# Patient Record
Sex: FEMALE | Race: BLACK OR AFRICAN AMERICAN | Employment: OTHER | URBAN - METROPOLITAN AREA
[De-identification: names, ages, dates, MRNs, and addresses within clinical notes are randomized per-mention and may not be internally consistent; named-entity substitution may affect disease eponyms.]

---

## 2017-04-17 DIAGNOSIS — I10 ESSENTIAL HYPERTENSION: ICD-10-CM

## 2017-04-17 NOTE — TELEPHONE ENCOUNTER
From: Vanesa Cheek  To: Ct Alicia DO  Sent: 4/17/2017 9:56 AM CDT  Subject: Medication Renewal Request    Original authorizing provider: DO Treasure Salas would like a refill of the following medications:  hydrochlorothiazide

## 2017-04-22 RX ORDER — HYDROCHLOROTHIAZIDE 12.5 MG/1
12.5 TABLET ORAL
Qty: 90 TABLET | Refills: 0 | Status: SHIPPED | OUTPATIENT
Start: 2017-04-22 | End: 2017-07-04

## 2017-04-22 RX ORDER — HYDROCHLOROTHIAZIDE 12.5 MG/1
TABLET ORAL
Qty: 90 TABLET | Refills: 0 | OUTPATIENT
Start: 2017-04-22

## 2017-04-25 ENCOUNTER — OFFICE VISIT (OUTPATIENT)
Dept: FAMILY MEDICINE CLINIC | Facility: CLINIC | Age: 57
End: 2017-04-25

## 2017-04-25 VITALS
WEIGHT: 244 LBS | OXYGEN SATURATION: 98 % | BODY MASS INDEX: 41.66 KG/M2 | DIASTOLIC BLOOD PRESSURE: 76 MMHG | RESPIRATION RATE: 20 BRPM | TEMPERATURE: 99 F | SYSTOLIC BLOOD PRESSURE: 126 MMHG | HEART RATE: 77 BPM | HEIGHT: 64 IN

## 2017-04-25 DIAGNOSIS — I10 ESSENTIAL HYPERTENSION: Primary | ICD-10-CM

## 2017-04-25 PROCEDURE — 99213 OFFICE O/P EST LOW 20 MIN: CPT | Performed by: FAMILY MEDICINE

## 2017-04-25 NOTE — PROGRESS NOTES
HPI:    Patient ID: Demetrius Mabry is a 62year old female. HTN  This is a chronic problem. The current episode started more than 1 year ago. The problem is unchanged. The problem is controlled.  Pertinent negatives include no anxiety, blurred vision,

## 2017-07-04 DIAGNOSIS — I10 ESSENTIAL HYPERTENSION: ICD-10-CM

## 2017-07-05 RX ORDER — HYDROCHLOROTHIAZIDE 12.5 MG/1
TABLET ORAL
Qty: 90 TABLET | Refills: 0 | Status: SHIPPED | OUTPATIENT
Start: 2017-07-05 | End: 2017-10-02

## 2017-10-02 DIAGNOSIS — I10 ESSENTIAL HYPERTENSION: ICD-10-CM

## 2017-10-03 RX ORDER — HYDROCHLOROTHIAZIDE 12.5 MG/1
TABLET ORAL
Qty: 90 TABLET | Refills: 0 | Status: SHIPPED | OUTPATIENT
Start: 2017-10-03 | End: 2017-12-04

## 2017-12-04 ENCOUNTER — OFFICE VISIT (OUTPATIENT)
Dept: FAMILY MEDICINE CLINIC | Facility: CLINIC | Age: 57
End: 2017-12-04

## 2017-12-04 VITALS
WEIGHT: 246 LBS | BODY MASS INDEX: 42 KG/M2 | HEART RATE: 90 BPM | TEMPERATURE: 98 F | OXYGEN SATURATION: 98 % | RESPIRATION RATE: 22 BRPM | SYSTOLIC BLOOD PRESSURE: 136 MMHG | DIASTOLIC BLOOD PRESSURE: 80 MMHG | HEIGHT: 64 IN

## 2017-12-04 DIAGNOSIS — Z13.820 SCREENING FOR OSTEOPOROSIS: ICD-10-CM

## 2017-12-04 DIAGNOSIS — Z12.31 ENCOUNTER FOR SCREENING MAMMOGRAM FOR HIGH-RISK PATIENT: ICD-10-CM

## 2017-12-04 DIAGNOSIS — Z12.11 COLON CANCER SCREENING: ICD-10-CM

## 2017-12-04 DIAGNOSIS — Z00.00 ANNUAL PHYSICAL EXAM: Primary | ICD-10-CM

## 2017-12-04 DIAGNOSIS — E04.9 GOITER: ICD-10-CM

## 2017-12-04 DIAGNOSIS — I10 ESSENTIAL HYPERTENSION: ICD-10-CM

## 2017-12-04 DIAGNOSIS — Z01.419 WELL WOMAN EXAM: ICD-10-CM

## 2017-12-04 PROCEDURE — 88175 CYTOPATH C/V AUTO FLUID REDO: CPT | Performed by: FAMILY MEDICINE

## 2017-12-04 PROCEDURE — 99396 PREV VISIT EST AGE 40-64: CPT | Performed by: FAMILY MEDICINE

## 2017-12-04 PROCEDURE — 87624 HPV HI-RISK TYP POOLED RSLT: CPT | Performed by: FAMILY MEDICINE

## 2017-12-04 RX ORDER — HYDROCHLOROTHIAZIDE 12.5 MG/1
12.5 TABLET ORAL
Qty: 90 TABLET | Refills: 1 | Status: SHIPPED | OUTPATIENT
Start: 2017-12-04 | End: 2018-06-06

## 2017-12-04 NOTE — PROGRESS NOTES
HPI:   Herman Neal is a 62year old female who presents for a complete physical exam.      Wt Readings from Last 6 Encounters:  12/04/17 : 246 lb  04/25/17 : 244 lb  09/02/16 : 240 lb  11/23/15 : 249 lb  07/29/15 : 244 lb  06/10/15 : 249 lb    Body m [facility]    Exercise: minimal.  Diet: watches minimally     REVIEW OF SYSTEMS:   GENERAL: feels well otherwise  SKIN: denies any unusual skin lesions  EYES:denies blurred vision or double vision  HEENT: denies nasal congestion, sinus pain or ST  LUNGS: Dispense: 90 tablet; Refill: 1    2. Annual physical exam    - FREE T4 (FREE THYROXINE); Future  - ASSAY, THYROID STIM HORMONE; Future  - LIPID PANEL; Future  - CBC WITH DIFFERENTIAL WITH PLATELET;  Future  - VITAMIN B12; Future  - VITAMIN D, 25-HYDROXY; Fu

## 2017-12-16 ENCOUNTER — HOSPITAL ENCOUNTER (OUTPATIENT)
Dept: ULTRASOUND IMAGING | Age: 57
Discharge: HOME | End: 2017-12-16
Attending: FAMILY MEDICINE
Payer: COMMERCIAL

## 2017-12-16 DIAGNOSIS — E04.9 GOITER: ICD-10-CM

## 2017-12-16 PROCEDURE — 76536 US EXAM OF HEAD AND NECK: CPT | Performed by: FAMILY MEDICINE

## 2018-01-06 ENCOUNTER — HOSPITAL ENCOUNTER (OUTPATIENT)
Dept: BONE DENSITY | Age: 58
Discharge: HOME | End: 2018-01-06
Attending: FAMILY MEDICINE
Payer: COMMERCIAL

## 2018-01-06 ENCOUNTER — HOSPITAL ENCOUNTER (OUTPATIENT)
Dept: MAMMOGRAPHY | Age: 58
Discharge: HOME | End: 2018-01-06
Attending: FAMILY MEDICINE
Payer: COMMERCIAL

## 2018-01-06 DIAGNOSIS — Z13.820 SCREENING FOR OSTEOPOROSIS: ICD-10-CM

## 2018-01-06 DIAGNOSIS — Z12.31 ENCOUNTER FOR SCREENING MAMMOGRAM FOR HIGH-RISK PATIENT: ICD-10-CM

## 2018-01-06 PROCEDURE — 77080 DXA BONE DENSITY AXIAL: CPT | Performed by: FAMILY MEDICINE

## 2018-01-06 PROCEDURE — 77063 BREAST TOMOSYNTHESIS BI: CPT | Performed by: FAMILY MEDICINE

## 2018-01-06 PROCEDURE — 77067 SCR MAMMO BI INCL CAD: CPT | Performed by: FAMILY MEDICINE

## 2018-01-09 ENCOUNTER — PATIENT MESSAGE (OUTPATIENT)
Dept: FAMILY MEDICINE CLINIC | Facility: CLINIC | Age: 58
End: 2018-01-09

## 2018-01-10 NOTE — TELEPHONE ENCOUNTER
Labs needed to assess vit d dosing ; labs in the system  Calcium 1,200 mg total ; at least half of that from food

## 2018-01-10 NOTE — TELEPHONE ENCOUNTER
From: Aditya Montenegro  To: Nedra Sanchez DO  Sent: 1/9/2018 5:44 PM CST  Subject: Test Results Question    I have a question about XR DEXA BONE DENSITOMETRY (CPT=77080) resulted on 1/6/18, 11:21 AM.    How much mg vitamin D should I take and also calciu

## 2018-01-15 LAB
ABSOLUTE BASOPHILS: 18 CELLS/UL (ref 0–200)
ABSOLUTE EOSINOPHILS: 60 CELLS/UL (ref 15–500)
ABSOLUTE LYMPHOCYTES: 2802 CELLS/UL (ref 850–3900)
ABSOLUTE MONOCYTES: 384 CELLS/UL (ref 200–950)
ABSOLUTE NEUTROPHILS: 2736 CELLS/UL (ref 1500–7800)
ALBUMIN/GLOBULIN RATIO: 1.4 (CALC) (ref 1–2.5)
ALBUMIN: 3.8 G/DL (ref 3.6–5.1)
ALKALINE PHOSPHATASE: 88 U/L (ref 33–130)
ALT: 39 U/L (ref 6–29)
AST: 27 U/L (ref 10–35)
BASOPHILS: 0.3 %
BILIRUBIN, TOTAL: 0.5 MG/DL (ref 0.2–1.2)
BUN: 12 MG/DL (ref 7–25)
CALCIUM: 9.2 MG/DL (ref 8.6–10.4)
CARBON DIOXIDE: 29 MMOL/L (ref 20–31)
CHLORIDE: 106 MMOL/L (ref 98–110)
CHOL/HDLC RATIO: 2.1 (CALC)
CHOLESTEROL, TOTAL: 161 MG/DL
CREATININE: 0.85 MG/DL (ref 0.5–1.05)
EGFR IF AFRICN AM: 88 ML/MIN/1.73M2
EGFR IF NONAFRICN AM: 76 ML/MIN/1.73M2
EOSINOPHILS: 1 %
GLOBULIN: 2.8 G/DL (CALC) (ref 1.9–3.7)
GLUCOSE: 91 MG/DL (ref 65–99)
HDL CHOLESTEROL: 78 MG/DL
HEMATOCRIT: 38.3 % (ref 35–45)
HEMOGLOBIN: 12.6 G/DL (ref 11.7–15.5)
LDL-CHOLESTEROL: 70 MG/DL (CALC)
LYMPHOCYTES: 46.7 %
MCH: 27.7 PG (ref 27–33)
MCHC: 32.9 G/DL (ref 32–36)
MCV: 84.2 FL (ref 80–100)
MONOCYTES: 6.4 %
MPV: 10.4 FL (ref 7.5–12.5)
NEUTROPHILS: 45.6 %
NON-HDL CHOLESTEROL: 83 MG/DL (CALC)
PLATELET COUNT: 273 THOUSAND/UL (ref 140–400)
POTASSIUM: 4 MMOL/L (ref 3.5–5.3)
PROTEIN, TOTAL: 6.6 G/DL (ref 6.1–8.1)
RDW: 14.4 % (ref 11–15)
RED BLOOD CELL COUNT: 4.55 MILLION/UL (ref 3.8–5.1)
SODIUM: 141 MMOL/L (ref 135–146)
T4, FREE: 1 NG/DL (ref 0.8–1.8)
TRIGLYCERIDES: 45 MG/DL
TSH: 1.05 MIU/L (ref 0.4–4.5)
VITAMIN B12: 578 PG/ML (ref 200–1100)
VITAMIN D, 25-OH, TOTAL: 26 NG/ML (ref 30–100)
WHITE BLOOD CELL COUNT: 6 THOUSAND/UL (ref 3.8–10.8)

## 2018-01-18 ENCOUNTER — APPOINTMENT (OUTPATIENT)
Dept: LAB | Facility: HOSPITAL | Age: 58
End: 2018-01-18
Attending: FAMILY MEDICINE
Payer: COMMERCIAL

## 2018-01-18 DIAGNOSIS — Z12.11 COLON CANCER SCREENING: ICD-10-CM

## 2018-01-18 PROCEDURE — 82272 OCCULT BLD FECES 1-3 TESTS: CPT

## 2018-02-12 ENCOUNTER — PATIENT MESSAGE (OUTPATIENT)
Dept: FAMILY MEDICINE CLINIC | Facility: CLINIC | Age: 58
End: 2018-02-12

## 2018-02-13 NOTE — TELEPHONE ENCOUNTER
From: Rebecca Akins  To: Lacne Rod DO  Sent: 2/12/2018 8:38 PM CST  Subject: Other    Hi, when do I need to redo the vitamin D3 blood work?

## 2018-05-18 ENCOUNTER — NURSE ONLY (OUTPATIENT)
Dept: FAMILY MEDICINE CLINIC | Facility: CLINIC | Age: 58
End: 2018-05-18

## 2018-05-18 VITALS
HEART RATE: 88 BPM | DIASTOLIC BLOOD PRESSURE: 80 MMHG | RESPIRATION RATE: 16 BRPM | TEMPERATURE: 99 F | OXYGEN SATURATION: 98 % | WEIGHT: 245 LBS | HEIGHT: 63 IN | SYSTOLIC BLOOD PRESSURE: 132 MMHG | BODY MASS INDEX: 43.41 KG/M2

## 2018-05-18 DIAGNOSIS — J06.9 VIRAL URI WITH COUGH: Primary | ICD-10-CM

## 2018-05-18 PROCEDURE — 99213 OFFICE O/P EST LOW 20 MIN: CPT | Performed by: NURSE PRACTITIONER

## 2018-05-18 NOTE — PROGRESS NOTES
CHIEF COMPLAINT:   Patient presents with:  Cough: Bodyaches,chills. HPI:   Michael Collins is a 62year old female who presents for sudden onset of flu-like symptoms. Symptoms began 1 days ago.   Patient reports body aches, chills,  dry cough, dull THROAT: Oral mucosa pink, moist. Posterior pharynx is without erythematous. no exudates. Tonsils 1/4. NECK: Supple, non-tender  LUNGS: clear to auscultation bilaterally, no wheezes or rhonchi. Breathing is non labored.   CARDIO: RRR without murmur  GI: a · Avoid being exposed to cigarette smoke (yours or others’).   · You may use acetaminophen or ibuprofen to control pain and fever, unless another medicine was prescribed. If you have chronic liver or kidney disease, have ever had a stomach ulcer or gastroin

## 2018-06-06 DIAGNOSIS — I10 ESSENTIAL HYPERTENSION: ICD-10-CM

## 2018-06-07 RX ORDER — HYDROCHLOROTHIAZIDE 12.5 MG/1
TABLET ORAL
Qty: 30 TABLET | Refills: 0 | Status: SHIPPED | OUTPATIENT
Start: 2018-06-07 | End: 2018-11-19

## 2018-06-09 ENCOUNTER — PATIENT MESSAGE (OUTPATIENT)
Dept: FAMILY MEDICINE CLINIC | Facility: CLINIC | Age: 58
End: 2018-06-09

## 2018-06-09 DIAGNOSIS — E55.9 VITAMIN D DEFICIENCY: Primary | ICD-10-CM

## 2018-06-11 NOTE — TELEPHONE ENCOUNTER
From: Luisito Dumont  To: Dany Hays DO  Sent: 6/9/2018 11:20 AM CDT  Subject: Test Results Question    I was under the impression I was doing a repeat vitamin D3 test only to find out the order was for CBC.  Not sure what that is for but the quest l

## 2018-08-06 ENCOUNTER — APPOINTMENT (OUTPATIENT)
Dept: GENERAL RADIOLOGY | Age: 58
End: 2018-08-06
Attending: EMERGENCY MEDICINE
Payer: COMMERCIAL

## 2018-08-06 ENCOUNTER — APPOINTMENT (OUTPATIENT)
Dept: ULTRASOUND IMAGING | Age: 58
End: 2018-08-06
Attending: EMERGENCY MEDICINE
Payer: COMMERCIAL

## 2018-08-06 ENCOUNTER — PATIENT MESSAGE (OUTPATIENT)
Dept: FAMILY MEDICINE CLINIC | Facility: CLINIC | Age: 58
End: 2018-08-06

## 2018-08-06 ENCOUNTER — HOSPITAL ENCOUNTER (EMERGENCY)
Age: 58
Discharge: HOME | End: 2018-08-06
Attending: EMERGENCY MEDICINE
Payer: COMMERCIAL

## 2018-08-06 VITALS
OXYGEN SATURATION: 99 % | TEMPERATURE: 98 F | BODY MASS INDEX: 40.97 KG/M2 | DIASTOLIC BLOOD PRESSURE: 70 MMHG | HEIGHT: 64 IN | HEART RATE: 90 BPM | RESPIRATION RATE: 18 BRPM | SYSTOLIC BLOOD PRESSURE: 134 MMHG | WEIGHT: 240 LBS

## 2018-08-06 DIAGNOSIS — M79.89 SWELLING OF LEFT LOWER EXTREMITY: ICD-10-CM

## 2018-08-06 DIAGNOSIS — S86.912A KNEE STRAIN, LEFT, INITIAL ENCOUNTER: Primary | ICD-10-CM

## 2018-08-06 DIAGNOSIS — M71.22 BAKER'S CYST OF KNEE, LEFT: ICD-10-CM

## 2018-08-06 PROCEDURE — 73560 X-RAY EXAM OF KNEE 1 OR 2: CPT | Performed by: EMERGENCY MEDICINE

## 2018-08-06 PROCEDURE — 99284 EMERGENCY DEPT VISIT MOD MDM: CPT

## 2018-08-06 PROCEDURE — 93971 EXTREMITY STUDY: CPT | Performed by: EMERGENCY MEDICINE

## 2018-08-06 NOTE — ED PROVIDER NOTES
Patient Seen in: [facility] In [place]    History   Patient presents with:  Deep Vein Thrombosis (cardiovascular)    Stated Complaint: Left leg swelling    HPI    This is a 55-year-old female who presents with what she describes some murmurs    Extremities: Good pulses bilaterally. The left calf is swollen is mildly tender. There is no redness or cellulitis noted she also has some mild tenderness over the left knee.   No ankle or foot tenderness is noted good pulses sensory exam is swelling and pain    FINDINGS:  EXTREMITY EXAMINED:  Left lower extremity SAPHENOFEMORAL JUNCTION:  No reflux. THROMBI:  None visible. COMPRESSION:  Normal compressibility, phasicity, and augmentation. OTHER:  Orellana's cyst measures 3.8 x 1.2 x 2.4 cm.

## 2018-08-07 NOTE — TELEPHONE ENCOUNTER
From: Sherry Brito  To: Remedios Tomlinson DO  Sent: 8/6/2018 10:43 PM CDT  Subject: Non-Urgent Medical Question    I had knee pain with swelling and went to urgent care, the doctor suspected blood clot and sent me to [facility] ) Brenda Polanco

## 2018-08-13 ENCOUNTER — OFFICE VISIT (OUTPATIENT)
Dept: FAMILY MEDICINE CLINIC | Facility: CLINIC | Age: 58
End: 2018-08-13
Payer: COMMERCIAL

## 2018-08-13 VITALS
OXYGEN SATURATION: 98 % | SYSTOLIC BLOOD PRESSURE: 138 MMHG | HEART RATE: 84 BPM | BODY MASS INDEX: 42 KG/M2 | WEIGHT: 246 LBS | DIASTOLIC BLOOD PRESSURE: 82 MMHG | RESPIRATION RATE: 16 BRPM | TEMPERATURE: 98 F | HEIGHT: 64 IN

## 2018-08-13 DIAGNOSIS — M71.22 SYNOVIAL CYST OF LEFT POPLITEAL SPACE: Primary | ICD-10-CM

## 2018-08-13 PROCEDURE — 99213 OFFICE O/P EST LOW 20 MIN: CPT | Performed by: FAMILY MEDICINE

## 2018-08-13 PROCEDURE — 1111F DSCHRG MED/CURRENT MED MERGE: CPT | Performed by: FAMILY MEDICINE

## 2018-08-13 NOTE — PROGRESS NOTES
HPI:    Patient ID: Alvin Krishnan is a 62year old female. Pt came in today for an ER fup for swelling and pain in her left leg.  Pt states it started over a week ago when her left knee was hurting and then she noticed her left calf was starting to s see orthopedics to drain the baker's cyst    1.  Synovial cyst of left popliteal space  - ORTHOPEDIC - INTERNAL  Meds This Visit:  No prescriptions requested or ordered in this encounter    Imaging & Referrals:  ORTHOPEDIC - INTERNAL       LF#[id number]

## 2018-10-30 DIAGNOSIS — I10 ESSENTIAL HYPERTENSION: ICD-10-CM

## 2018-10-30 RX ORDER — HYDROCHLOROTHIAZIDE 12.5 MG/1
12.5 TABLET ORAL
Qty: 30 TABLET | Refills: 0 | OUTPATIENT
Start: 2018-10-30

## 2018-10-30 NOTE — TELEPHONE ENCOUNTER
Rx Request  HYDROCHLOROTHIAZIDE 12.5 MG Oral Tab    Disp:     30               R:  0          Last Visit: 08/13/2018    Last Refilled: 06/07/2018     Protocol Passed?  [email or web address].Art  ]       No[  ]

## 2018-11-19 ENCOUNTER — PATIENT MESSAGE (OUTPATIENT)
Dept: FAMILY MEDICINE CLINIC | Facility: CLINIC | Age: 58
End: 2018-11-19

## 2018-11-19 DIAGNOSIS — I10 ESSENTIAL HYPERTENSION: ICD-10-CM

## 2018-11-19 RX ORDER — HYDROCHLOROTHIAZIDE 12.5 MG/1
12.5 TABLET ORAL
Qty: 30 TABLET | Refills: 0 | Status: SHIPPED | OUTPATIENT
Start: 2018-11-19 | End: 2018-12-26

## 2018-11-19 NOTE — TELEPHONE ENCOUNTER
From: Franki Hoskins  To: Zeb Shukla DO  Sent: 11/19/2018 11:05 AM CST  Subject: Prescription Question    I requested a refill of my meds but still nothing so had to request again. I only have 5 tablets left. Thank you.

## 2018-12-26 ENCOUNTER — OFFICE VISIT (OUTPATIENT)
Dept: FAMILY MEDICINE CLINIC | Facility: CLINIC | Age: 58
End: 2018-12-26
Payer: COMMERCIAL

## 2018-12-26 ENCOUNTER — HOSPITAL ENCOUNTER (OUTPATIENT)
Dept: ULTRASOUND IMAGING | Age: 58
Discharge: HOME | End: 2018-12-26
Attending: FAMILY MEDICINE
Payer: COMMERCIAL

## 2018-12-26 VITALS
HEART RATE: 78 BPM | OXYGEN SATURATION: 98 % | WEIGHT: 241 LBS | DIASTOLIC BLOOD PRESSURE: 74 MMHG | HEIGHT: 64 IN | BODY MASS INDEX: 41.15 KG/M2 | SYSTOLIC BLOOD PRESSURE: 118 MMHG | RESPIRATION RATE: 16 BRPM | TEMPERATURE: 98 F

## 2018-12-26 DIAGNOSIS — Z01.419 WELL WOMAN EXAM WITH ROUTINE GYNECOLOGICAL EXAM: Primary | ICD-10-CM

## 2018-12-26 DIAGNOSIS — E04.9 GOITER: ICD-10-CM

## 2018-12-26 DIAGNOSIS — I10 ESSENTIAL HYPERTENSION: ICD-10-CM

## 2018-12-26 DIAGNOSIS — Z00.00 ANNUAL PHYSICAL EXAM: ICD-10-CM

## 2018-12-26 DIAGNOSIS — Z12.11 SCREEN FOR COLON CANCER: ICD-10-CM

## 2018-12-26 DIAGNOSIS — Z12.31 ENCOUNTER FOR SCREENING MAMMOGRAM FOR HIGH-RISK PATIENT: ICD-10-CM

## 2018-12-26 PROCEDURE — 99396 PREV VISIT EST AGE 40-64: CPT | Performed by: FAMILY MEDICINE

## 2018-12-26 PROCEDURE — 76536 US EXAM OF HEAD AND NECK: CPT | Performed by: FAMILY MEDICINE

## 2018-12-26 RX ORDER — HYDROCHLOROTHIAZIDE 12.5 MG/1
12.5 TABLET ORAL
Qty: 90 TABLET | Refills: 1 | Status: SHIPPED | OUTPATIENT
Start: 2018-12-26 | End: 2019-05-28

## 2018-12-26 NOTE — PROGRESS NOTES
HPI:   Zachary Beaulieu is a 62year old female who presents for a complete physical exam.      Wt Readings from Last 6 Encounters:  12/26/18 : 241 lb  08/13/18 : 246 lb  08/06/18 : 240 lb  05/18/18 : 245 lb  12/04/17 : 246 lb  04/25/17 : 244 lb    Body m minimal.  Diet: watches minimally     REVIEW OF SYSTEMS:   GENERAL: feels well otherwise  SKIN: denies any unusual skin lesions  EYES:denies blurred vision or double vision  HEENT: denies nasal congestion, sinus pain or ST  LUNGS: denies shortness of breat SERGEI (CPT=77067/77587); Future    3. Annual physical exam    - FREE T4 (FREE THYROXINE); Future  - ASSAY, THYROID STIM HORMONE; Future  - LIPID PANEL; Future  - CBC WITH DIFFERENTIAL WITH PLATELET;  Future  - VITAMIN B12; Future  - VITAMIN D, 25-HYDROXY; F

## 2019-01-19 ENCOUNTER — HOSPITAL ENCOUNTER (OUTPATIENT)
Dept: MAMMOGRAPHY | Age: 59
Discharge: HOME | End: 2019-01-19
Attending: FAMILY MEDICINE
Payer: COMMERCIAL

## 2019-01-19 DIAGNOSIS — Z12.31 ENCOUNTER FOR SCREENING MAMMOGRAM FOR HIGH-RISK PATIENT: ICD-10-CM

## 2019-01-19 PROCEDURE — 77063 BREAST TOMOSYNTHESIS BI: CPT | Performed by: FAMILY MEDICINE

## 2019-01-19 PROCEDURE — 77067 SCR MAMMO BI INCL CAD: CPT | Performed by: FAMILY MEDICINE

## 2019-05-28 ENCOUNTER — OFFICE VISIT (OUTPATIENT)
Dept: FAMILY MEDICINE CLINIC | Facility: CLINIC | Age: 59
End: 2019-05-28
Payer: COMMERCIAL

## 2019-05-28 VITALS
HEIGHT: 64 IN | OXYGEN SATURATION: 99 % | BODY MASS INDEX: 39.61 KG/M2 | SYSTOLIC BLOOD PRESSURE: 120 MMHG | WEIGHT: 232 LBS | RESPIRATION RATE: 16 BRPM | DIASTOLIC BLOOD PRESSURE: 72 MMHG | HEART RATE: 88 BPM | TEMPERATURE: 98 F

## 2019-05-28 DIAGNOSIS — Z12.11 COLON CANCER SCREENING: ICD-10-CM

## 2019-05-28 DIAGNOSIS — R03.0 ELEVATED BLOOD PRESSURE READING: Primary | ICD-10-CM

## 2019-05-28 PROCEDURE — 99213 OFFICE O/P EST LOW 20 MIN: CPT | Performed by: FAMILY MEDICINE

## 2019-05-28 NOTE — PROGRESS NOTES
HPI:   Patricia Jacobson is a 61year old female who presents for bp follow up     Pt has lost 13 lbs from a low carb diet and regular exercise   Pt c/o dry eye - eye doctor thinks it is related to her bp med   Pt is not checking bp   Patient denies chest nourished,in no apparent distress  CARDIO: RRR without murmur  LUNGS: clear to auscultation  NECK: supple,no adenopathy, no JVD no carotid bruits  HEENT: atraumatic, normocephalic,ears and throat are clear  EYES:PERRLA, EOMI, normal,conjunctiva are clear

## 2019-06-04 PROCEDURE — 82274 ASSAY TEST FOR BLOOD FECAL: CPT | Performed by: FAMILY MEDICINE

## 2019-06-23 DIAGNOSIS — I10 ESSENTIAL HYPERTENSION: ICD-10-CM

## 2019-06-24 RX ORDER — HYDROCHLOROTHIAZIDE 12.5 MG/1
TABLET ORAL
Qty: 90 TABLET | Refills: 1 | Status: SHIPPED | OUTPATIENT
Start: 2019-06-24 | End: 2019-12-26

## 2019-06-25 ENCOUNTER — NURSE ONLY (OUTPATIENT)
Dept: FAMILY MEDICINE CLINIC | Facility: CLINIC | Age: 59
End: 2019-06-25
Payer: COMMERCIAL

## 2019-06-25 VITALS — DIASTOLIC BLOOD PRESSURE: 80 MMHG | SYSTOLIC BLOOD PRESSURE: 122 MMHG

## 2019-12-25 NOTE — PROGRESS NOTES
HPI:   Isai Larson is a 61year old female who presents for a complete physical exam.      Wt Readings from Last 6 Encounters:  12/26/19 : 224 lb (101.6 kg)  05/28/19 : 232 lb (105.2 kg)  12/26/18 : 241 lb (109.3 kg)  08/13/18 : 246 lb (111.6 kg)  08 skin lesions  EYES:denies blurred vision or double vision  HEENT: denies nasal congestion, sinus pain or ST  LUNGS: denies shortness of breath with exertion  CARDIOVASCULAR: denies chest pain on exertion  GI: denies abdominal pain,denies heartburn  : den [id number]  - COMP METABOLIC PANEL (14)    3. Encounter for screening mammogram for breast cancer    - [facility] DOUG 2D+3D SCREENING BILAT (CPT=77067/90182); Future    4. Screening for osteoporosis    - XR DEXA BONE DENSITOMETRY (CPT=77080); Future    5.  Essent

## 2019-12-26 ENCOUNTER — OFFICE VISIT (OUTPATIENT)
Dept: FAMILY MEDICINE CLINIC | Facility: CLINIC | Age: 59
End: 2019-12-26
Payer: COMMERCIAL

## 2019-12-26 VITALS
HEART RATE: 92 BPM | WEIGHT: 224 LBS | DIASTOLIC BLOOD PRESSURE: 86 MMHG | OXYGEN SATURATION: 96 % | RESPIRATION RATE: 16 BRPM | SYSTOLIC BLOOD PRESSURE: 136 MMHG | BODY MASS INDEX: 39.2 KG/M2 | TEMPERATURE: 99 F | HEIGHT: 63.25 IN

## 2019-12-26 DIAGNOSIS — Z00.00 ANNUAL PHYSICAL EXAM: ICD-10-CM

## 2019-12-26 DIAGNOSIS — Z01.419 WELL WOMAN EXAM WITH ROUTINE GYNECOLOGICAL EXAM: Primary | ICD-10-CM

## 2019-12-26 DIAGNOSIS — Z12.31 ENCOUNTER FOR SCREENING MAMMOGRAM FOR BREAST CANCER: ICD-10-CM

## 2019-12-26 DIAGNOSIS — I10 ESSENTIAL HYPERTENSION: ICD-10-CM

## 2019-12-26 DIAGNOSIS — Z12.11 COLON CANCER SCREENING: ICD-10-CM

## 2019-12-26 DIAGNOSIS — Z13.820 SCREENING FOR OSTEOPOROSIS: ICD-10-CM

## 2019-12-26 PROCEDURE — 99396 PREV VISIT EST AGE 40-64: CPT | Performed by: FAMILY MEDICINE

## 2020-03-13 ENCOUNTER — TELEPHONE (OUTPATIENT)
Dept: FAMILY MEDICINE CLINIC | Facility: CLINIC | Age: 60
End: 2020-03-13

## 2021-04-05 ENCOUNTER — OFFICE VISIT (OUTPATIENT)
Dept: FAMILY MEDICINE CLINIC | Facility: CLINIC | Age: 61
End: 2021-04-05
Payer: COMMERCIAL

## 2021-04-05 VITALS
HEART RATE: 95 BPM | WEIGHT: 241 LBS | RESPIRATION RATE: 18 BRPM | DIASTOLIC BLOOD PRESSURE: 96 MMHG | BODY MASS INDEX: 42.17 KG/M2 | HEIGHT: 63.25 IN | OXYGEN SATURATION: 99 % | SYSTOLIC BLOOD PRESSURE: 148 MMHG | TEMPERATURE: 98 F

## 2021-04-05 DIAGNOSIS — Z12.11 COLON CANCER SCREENING: ICD-10-CM

## 2021-04-05 DIAGNOSIS — M75.81 RIGHT ROTATOR CUFF TENDONITIS: ICD-10-CM

## 2021-04-05 DIAGNOSIS — Z00.00 ANNUAL PHYSICAL EXAM: ICD-10-CM

## 2021-04-05 DIAGNOSIS — I10 ESSENTIAL HYPERTENSION: ICD-10-CM

## 2021-04-05 DIAGNOSIS — Z12.31 ENCOUNTER FOR SCREENING MAMMOGRAM FOR HIGH-RISK PATIENT: ICD-10-CM

## 2021-04-05 DIAGNOSIS — Z13.820 SCREENING FOR OSTEOPOROSIS: ICD-10-CM

## 2021-04-05 DIAGNOSIS — M25.561 CHRONIC PAIN OF RIGHT KNEE: ICD-10-CM

## 2021-04-05 DIAGNOSIS — Z01.419 WELL WOMAN EXAM WITH ROUTINE GYNECOLOGICAL EXAM: Primary | ICD-10-CM

## 2021-04-05 DIAGNOSIS — M79.645 FINGER PAIN, LEFT: ICD-10-CM

## 2021-04-05 DIAGNOSIS — G89.29 CHRONIC PAIN OF RIGHT KNEE: ICD-10-CM

## 2021-04-05 PROCEDURE — 90471 IMMUNIZATION ADMIN: CPT | Performed by: FAMILY MEDICINE

## 2021-04-05 PROCEDURE — 90715 TDAP VACCINE 7 YRS/> IM: CPT | Performed by: FAMILY MEDICINE

## 2021-04-05 PROCEDURE — 99396 PREV VISIT EST AGE 40-64: CPT | Performed by: FAMILY MEDICINE

## 2021-04-05 PROCEDURE — 3008F BODY MASS INDEX DOCD: CPT | Performed by: FAMILY MEDICINE

## 2021-04-05 PROCEDURE — 3080F DIAST BP >= 90 MM HG: CPT | Performed by: FAMILY MEDICINE

## 2021-04-05 PROCEDURE — 3077F SYST BP >= 140 MM HG: CPT | Performed by: FAMILY MEDICINE

## 2021-04-05 PROCEDURE — 99213 OFFICE O/P EST LOW 20 MIN: CPT | Performed by: FAMILY MEDICINE

## 2021-04-05 RX ORDER — LOSARTAN POTASSIUM 25 MG/1
25 TABLET ORAL DAILY
Qty: 30 TABLET | Refills: 0 | Status: SHIPPED | OUTPATIENT
Start: 2021-04-05 | End: 2021-05-03

## 2021-04-05 NOTE — PROGRESS NOTES
HPI:   Jessica Keith is a 64year old female who presents for a complete physical exam.      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  Diet: watch Right knee: + medial joint line pain / neg lachman/ normal MCL LCL   EXTREMITIES: no cyanosis, clubbing or edema  NEURO: cranial nerves are intact,motor and sensory are grossly intact    ASSESSMENT AND PLAN:   Zachary Beaulieu is a 64year old female wh

## 2021-04-17 ENCOUNTER — HOSPITAL ENCOUNTER (OUTPATIENT)
Dept: MAMMOGRAPHY | Age: 61
Discharge: HOME | End: 2021-04-17
Attending: FAMILY MEDICINE
Payer: COMMERCIAL

## 2021-04-17 ENCOUNTER — HOSPITAL ENCOUNTER (OUTPATIENT)
Dept: BONE DENSITY | Age: 61
Discharge: HOME | End: 2021-04-17
Attending: FAMILY MEDICINE
Payer: COMMERCIAL

## 2021-04-17 ENCOUNTER — HOSPITAL ENCOUNTER (OUTPATIENT)
Dept: GENERAL RADIOLOGY | Age: 61
Discharge: HOME | End: 2021-04-17
Attending: FAMILY MEDICINE
Payer: COMMERCIAL

## 2021-04-17 DIAGNOSIS — Z13.820 SCREENING FOR OSTEOPOROSIS: ICD-10-CM

## 2021-04-17 DIAGNOSIS — Z12.31 ENCOUNTER FOR SCREENING MAMMOGRAM FOR HIGH-RISK PATIENT: ICD-10-CM

## 2021-04-17 DIAGNOSIS — M79.645 FINGER PAIN, LEFT: ICD-10-CM

## 2021-04-17 PROCEDURE — 77067 SCR MAMMO BI INCL CAD: CPT | Performed by: FAMILY MEDICINE

## 2021-04-17 PROCEDURE — 77063 BREAST TOMOSYNTHESIS BI: CPT | Performed by: FAMILY MEDICINE

## 2021-04-17 PROCEDURE — 77080 DXA BONE DENSITY AXIAL: CPT | Performed by: FAMILY MEDICINE

## 2021-04-17 PROCEDURE — 73140 X-RAY EXAM OF FINGER(S): CPT | Performed by: FAMILY MEDICINE

## 2021-05-03 RX ORDER — LOSARTAN POTASSIUM 25 MG/1
25 TABLET ORAL DAILY
Qty: 30 TABLET | Refills: 0 | Status: SHIPPED | OUTPATIENT
Start: 2021-05-03 | End: 2021-06-18

## 2021-06-18 ENCOUNTER — OFFICE VISIT (OUTPATIENT)
Dept: FAMILY MEDICINE CLINIC | Facility: CLINIC | Age: 61
End: 2021-06-18
Payer: COMMERCIAL

## 2021-06-18 VITALS
DIASTOLIC BLOOD PRESSURE: 100 MMHG | OXYGEN SATURATION: 99 % | RESPIRATION RATE: 16 BRPM | BODY MASS INDEX: 42.17 KG/M2 | HEIGHT: 63.5 IN | SYSTOLIC BLOOD PRESSURE: 148 MMHG | WEIGHT: 241 LBS | HEART RATE: 105 BPM

## 2021-06-18 DIAGNOSIS — I10 ESSENTIAL HYPERTENSION: Primary | ICD-10-CM

## 2021-06-18 PROCEDURE — 99213 OFFICE O/P EST LOW 20 MIN: CPT | Performed by: FAMILY MEDICINE

## 2021-06-18 PROCEDURE — 3077F SYST BP >= 140 MM HG: CPT | Performed by: FAMILY MEDICINE

## 2021-06-18 PROCEDURE — 3080F DIAST BP >= 90 MM HG: CPT | Performed by: FAMILY MEDICINE

## 2021-06-18 PROCEDURE — 3008F BODY MASS INDEX DOCD: CPT | Performed by: FAMILY MEDICINE

## 2021-06-18 RX ORDER — LOSARTAN POTASSIUM 50 MG/1
50 TABLET ORAL DAILY
Qty: 45 TABLET | Refills: 0 | Status: SHIPPED | OUTPATIENT
Start: 2021-06-18 | End: 2021-08-09

## 2021-06-18 NOTE — PROGRESS NOTES
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    GENERAL: alert and oriented X 3, well developed, well nourished,in no apparent distress  CARDIO: RRR without murmur  LUNGS: clear to auscultation  NECK: supple,no adenopathy, no JV

## 2021-07-22 ENCOUNTER — OFFICE VISIT (OUTPATIENT)
Dept: FAMILY MEDICINE CLINIC | Facility: CLINIC | Age: 61
End: 2021-07-22
Payer: COMMERCIAL

## 2021-07-22 VITALS
BODY MASS INDEX: 42 KG/M2 | WEIGHT: 240 LBS | DIASTOLIC BLOOD PRESSURE: 76 MMHG | OXYGEN SATURATION: 98 % | SYSTOLIC BLOOD PRESSURE: 140 MMHG | HEIGHT: 63.5 IN | HEART RATE: 91 BPM | RESPIRATION RATE: 16 BRPM

## 2021-07-22 DIAGNOSIS — I10 ESSENTIAL HYPERTENSION: ICD-10-CM

## 2021-07-22 DIAGNOSIS — G89.29 CHRONIC RIGHT SHOULDER PAIN: ICD-10-CM

## 2021-07-22 DIAGNOSIS — M25.611 DECREASED ROM OF RIGHT SHOULDER: Primary | ICD-10-CM

## 2021-07-22 DIAGNOSIS — M25.511 CHRONIC RIGHT SHOULDER PAIN: ICD-10-CM

## 2021-07-22 PROCEDURE — 99214 OFFICE O/P EST MOD 30 MIN: CPT | Performed by: FAMILY MEDICINE

## 2021-07-22 PROCEDURE — 3078F DIAST BP <80 MM HG: CPT | Performed by: FAMILY MEDICINE

## 2021-07-22 PROCEDURE — 3077F SYST BP >= 140 MM HG: CPT | Performed by: FAMILY MEDICINE

## 2021-07-22 PROCEDURE — 3008F BODY MASS INDEX DOCD: CPT | Performed by: FAMILY MEDICINE

## 2021-07-22 RX ORDER — DIAZEPAM 2 MG/1
2 TABLET ORAL EVERY 6 HOURS PRN
Qty: 2 TABLET | Refills: 0 | Status: SHIPPED | OUTPATIENT
Start: 2021-07-22

## 2021-07-22 NOTE — PROGRESS NOTES
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    GENERAL: alert and oriented X 3, well developed, well nourished,in no apparent distress  CARDIO: RRR without murmur  LUNGS: clear to

## 2021-08-08 DIAGNOSIS — I10 ESSENTIAL HYPERTENSION: ICD-10-CM

## 2021-08-09 DIAGNOSIS — I10 ESSENTIAL HYPERTENSION: ICD-10-CM

## 2021-08-09 RX ORDER — LOSARTAN POTASSIUM 50 MG/1
TABLET ORAL
Qty: 45 TABLET | Refills: 0 | Status: SHIPPED | OUTPATIENT
Start: 2021-08-09 | End: 2021-08-23

## 2021-08-09 RX ORDER — LOSARTAN POTASSIUM 50 MG/1
50 TABLET ORAL DAILY
Qty: 45 TABLET | Refills: 0 | OUTPATIENT
Start: 2021-08-09

## 2021-10-18 DIAGNOSIS — I10 ESSENTIAL HYPERTENSION: ICD-10-CM

## 2021-10-19 RX ORDER — LOSARTAN POTASSIUM 50 MG/1
50 TABLET ORAL DAILY
Qty: 90 TABLET | Refills: 0 | Status: SHIPPED | OUTPATIENT
Start: 2021-10-19 | End: 2022-01-03

## 2022-01-03 ENCOUNTER — TELEMEDICINE (OUTPATIENT)
Dept: FAMILY MEDICINE CLINIC | Facility: CLINIC | Age: 62
End: 2022-01-03

## 2022-01-03 VITALS — SYSTOLIC BLOOD PRESSURE: 128 MMHG | DIASTOLIC BLOOD PRESSURE: 83 MMHG

## 2022-01-03 DIAGNOSIS — I10 ESSENTIAL HYPERTENSION: ICD-10-CM

## 2022-01-03 PROCEDURE — 3074F SYST BP LT 130 MM HG: CPT | Performed by: FAMILY MEDICINE

## 2022-01-03 PROCEDURE — 99213 OFFICE O/P EST LOW 20 MIN: CPT | Performed by: FAMILY MEDICINE

## 2022-01-03 PROCEDURE — 3079F DIAST BP 80-89 MM HG: CPT | Performed by: FAMILY MEDICINE

## 2022-01-03 RX ORDER — LOSARTAN POTASSIUM 50 MG/1
50 TABLET ORAL DAILY
Qty: 90 TABLET | Refills: 0 | Status: SHIPPED | OUTPATIENT
Start: 2022-01-03

## 2022-01-03 NOTE — PROGRESS NOTES
This visit is conducted using Telemedicine with live, interactive video and audio.     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  90 tablet 0   • teresa time/date and no suicidal ideation      ASSESSMENT AND PLAN:   Jeremie Soliman is a 64year old female who presents with     1. Essential hypertension    - losartan 50 MG Oral Tab; Take 1 tablet (50 mg total) by mouth daily. Dispense: 90 tablet;  Refill:

## 2022-04-15 DIAGNOSIS — I10 ESSENTIAL HYPERTENSION: ICD-10-CM

## 2022-04-18 RX ORDER — LOSARTAN POTASSIUM 50 MG/1
50 TABLET ORAL DAILY
Qty: 90 TABLET | Refills: 0 | Status: SHIPPED | OUTPATIENT
Start: 2022-04-18 | End: 2022-05-19

## 2022-04-27 ENCOUNTER — HOSPITAL ENCOUNTER (OUTPATIENT)
Dept: MAMMOGRAPHY | Age: 62
Discharge: HOME | End: 2022-04-27
Attending: FAMILY MEDICINE
Payer: COMMERCIAL

## 2022-04-27 DIAGNOSIS — Z12.31 SCREENING MAMMOGRAM FOR BREAST CANCER: ICD-10-CM

## 2022-04-27 PROCEDURE — 77067 SCR MAMMO BI INCL CAD: CPT | Performed by: FAMILY MEDICINE

## 2022-04-27 PROCEDURE — 77063 BREAST TOMOSYNTHESIS BI: CPT | Performed by: FAMILY MEDICINE

## 2022-05-04 ENCOUNTER — OFFICE VISIT (OUTPATIENT)
Dept: FAMILY MEDICINE CLINIC | Facility: CLINIC | Age: 62
End: 2022-05-04
Payer: COMMERCIAL

## 2022-05-04 VITALS
OXYGEN SATURATION: 99 % | HEART RATE: 100 BPM | RESPIRATION RATE: 18 BRPM | TEMPERATURE: 98 F | HEIGHT: 65 IN | SYSTOLIC BLOOD PRESSURE: 136 MMHG | WEIGHT: 245 LBS | BODY MASS INDEX: 40.82 KG/M2 | DIASTOLIC BLOOD PRESSURE: 74 MMHG

## 2022-05-04 DIAGNOSIS — K21.9 GASTRIC REFLUX: Primary | ICD-10-CM

## 2022-05-04 PROCEDURE — 99213 OFFICE O/P EST LOW 20 MIN: CPT | Performed by: NURSE PRACTITIONER

## 2022-05-04 PROCEDURE — 3075F SYST BP GE 130 - 139MM HG: CPT | Performed by: NURSE PRACTITIONER

## 2022-05-04 PROCEDURE — 3008F BODY MASS INDEX DOCD: CPT | Performed by: NURSE PRACTITIONER

## 2022-05-04 PROCEDURE — 3078F DIAST BP <80 MM HG: CPT | Performed by: NURSE PRACTITIONER

## 2022-05-04 RX ORDER — FAMOTIDINE 20 MG/1
20 TABLET, FILM COATED ORAL 2 TIMES DAILY
Qty: 60 TABLET | Refills: 0 | Status: SHIPPED | OUTPATIENT
Start: 2022-05-04 | End: 2022-06-03

## 2022-05-19 ENCOUNTER — OFFICE VISIT (OUTPATIENT)
Dept: FAMILY MEDICINE CLINIC | Facility: CLINIC | Age: 62
End: 2022-05-19
Payer: COMMERCIAL

## 2022-05-19 VITALS
DIASTOLIC BLOOD PRESSURE: 80 MMHG | WEIGHT: 245 LBS | HEART RATE: 89 BPM | BODY MASS INDEX: 40.82 KG/M2 | RESPIRATION RATE: 18 BRPM | OXYGEN SATURATION: 100 % | SYSTOLIC BLOOD PRESSURE: 138 MMHG | HEIGHT: 65 IN

## 2022-05-19 DIAGNOSIS — I10 ESSENTIAL HYPERTENSION: ICD-10-CM

## 2022-05-19 DIAGNOSIS — R10.13 EPIGASTRIC PAIN: ICD-10-CM

## 2022-05-19 DIAGNOSIS — Z12.31 ENCOUNTER FOR SCREENING MAMMOGRAM FOR HIGH-RISK PATIENT: ICD-10-CM

## 2022-05-19 DIAGNOSIS — Z00.00 ANNUAL PHYSICAL EXAM: Primary | ICD-10-CM

## 2022-05-19 PROCEDURE — 3075F SYST BP GE 130 - 139MM HG: CPT | Performed by: FAMILY MEDICINE

## 2022-05-19 PROCEDURE — 3008F BODY MASS INDEX DOCD: CPT | Performed by: FAMILY MEDICINE

## 2022-05-19 PROCEDURE — 3079F DIAST BP 80-89 MM HG: CPT | Performed by: FAMILY MEDICINE

## 2022-05-19 PROCEDURE — 99396 PREV VISIT EST AGE 40-64: CPT | Performed by: FAMILY MEDICINE

## 2022-05-19 RX ORDER — LOSARTAN POTASSIUM 50 MG/1
50 TABLET ORAL DAILY
Qty: 90 TABLET | Refills: 1 | Status: SHIPPED | OUTPATIENT
Start: 2022-05-19

## 2022-05-19 RX ORDER — LOSARTAN POTASSIUM 50 MG/1
50 TABLET ORAL DAILY
Qty: 90 TABLET | Refills: 1 | Status: SHIPPED | OUTPATIENT
Start: 2022-05-19 | End: 2022-05-19

## 2022-05-25 LAB
ABSOLUTE BASOPHILS: 28 CELLS/UL (ref 0–200)
ABSOLUTE EOSINOPHILS: 61 CELLS/UL (ref 15–500)
ABSOLUTE LYMPHOCYTES: 2624 CELLS/UL (ref 850–3900)
ABSOLUTE MONOCYTES: 369 CELLS/UL (ref 200–950)
ABSOLUTE NEUTROPHILS: 2420 CELLS/UL (ref 1500–7800)
ALBUMIN/GLOBULIN RATIO: 1.3 (CALC) (ref 1–2.5)
ALBUMIN: 3.9 G/DL (ref 3.6–5.1)
ALKALINE PHOSPHATASE: 86 U/L (ref 37–153)
ALT: 19 U/L (ref 6–29)
AST: 23 U/L (ref 10–35)
BASOPHILS: 0.5 %
BILIRUBIN, TOTAL: 0.5 MG/DL (ref 0.2–1.2)
BUN: 10 MG/DL (ref 7–25)
CALCIUM: 9.4 MG/DL (ref 8.6–10.4)
CARBON DIOXIDE: 31 MMOL/L (ref 20–32)
CHLORIDE: 105 MMOL/L (ref 98–110)
CHOL/HDLC RATIO: 2.3 (CALC)
CHOLESTEROL, TOTAL: 169 MG/DL
CREATININE: 0.73 MG/DL (ref 0.5–0.99)
EGFR IF AFRICN AM: 102 ML/MIN/1.73M2
EGFR IF NONAFRICN AM: 88 ML/MIN/1.73M2
EOSINOPHILS: 1.1 %
GLOBULIN: 2.9 G/DL (CALC) (ref 1.9–3.7)
GLUCOSE: 85 MG/DL (ref 65–99)
HDL CHOLESTEROL: 72 MG/DL
HEMATOCRIT: 39.8 % (ref 35–45)
HEMOGLOBIN A1C: 5.6 % OF TOTAL HGB
HEMOGLOBIN: 12.9 G/DL (ref 11.7–15.5)
LDL-CHOLESTEROL: 83 MG/DL (CALC)
LYMPHOCYTES: 47.7 %
MCH: 27.9 PG (ref 27–33)
MCHC: 32.4 G/DL (ref 32–36)
MCV: 86 FL (ref 80–100)
MONOCYTES: 6.7 %
MPV: 10.4 FL (ref 7.5–12.5)
NEUTROPHILS: 44 %
NON-HDL CHOLESTEROL: 97 MG/DL (CALC)
PLATELET COUNT: 284 THOUSAND/UL (ref 140–400)
POTASSIUM: 4.9 MMOL/L (ref 3.5–5.3)
PROTEIN, TOTAL: 6.8 G/DL (ref 6.1–8.1)
RDW: 13.4 % (ref 11–15)
RED BLOOD CELL COUNT: 4.63 MILLION/UL (ref 3.8–5.1)
SODIUM: 141 MMOL/L (ref 135–146)
T4, FREE: 1.1 NG/DL (ref 0.8–1.8)
TRIGLYCERIDES: 59 MG/DL
TSH: 1.07 MIU/L (ref 0.4–4.5)
VITAMIN B12: 390 PG/ML (ref 200–1100)
VITAMIN D, 25-OH, TOTAL: 31 NG/ML (ref 30–100)
WHITE BLOOD CELL COUNT: 5.5 THOUSAND/UL (ref 3.8–10.8)

## 2022-11-04 ENCOUNTER — OFFICE VISIT (OUTPATIENT)
Dept: FAMILY MEDICINE CLINIC | Facility: CLINIC | Age: 62
End: 2022-11-04
Payer: COMMERCIAL

## 2022-11-04 VITALS
RESPIRATION RATE: 20 BRPM | OXYGEN SATURATION: 99 % | BODY MASS INDEX: 38.65 KG/M2 | HEIGHT: 65 IN | WEIGHT: 232 LBS | DIASTOLIC BLOOD PRESSURE: 73 MMHG | TEMPERATURE: 98 F | HEART RATE: 90 BPM | SYSTOLIC BLOOD PRESSURE: 124 MMHG

## 2022-11-04 DIAGNOSIS — J11.1 INFLUENZA-LIKE ILLNESS: Primary | ICD-10-CM

## 2022-11-04 LAB
CONTROL LINE PRESENT WITH A CLEAR BACKGROUND (YES/NO): YES YES/NO
KIT LOT #: NORMAL NUMERIC
STREP GRP A CUL-SCR: NEGATIVE

## 2022-11-04 PROCEDURE — 87637 SARSCOV2&INF A&B&RSV AMP PRB: CPT | Performed by: NURSE PRACTITIONER

## 2022-11-04 PROCEDURE — 87880 STREP A ASSAY W/OPTIC: CPT | Performed by: NURSE PRACTITIONER

## 2022-11-04 PROCEDURE — 3074F SYST BP LT 130 MM HG: CPT | Performed by: NURSE PRACTITIONER

## 2022-11-04 PROCEDURE — 3078F DIAST BP <80 MM HG: CPT | Performed by: NURSE PRACTITIONER

## 2022-11-04 PROCEDURE — 3008F BODY MASS INDEX DOCD: CPT | Performed by: NURSE PRACTITIONER

## 2022-11-04 PROCEDURE — 99213 OFFICE O/P EST LOW 20 MIN: CPT | Performed by: NURSE PRACTITIONER

## 2022-11-04 NOTE — PATIENT INSTRUCTIONS
See next page for care. You may take Ibuprofen 600 mg every 6 hours as needed for symptoms, or continue your medication you are currently taking if working well. Follow up with PCP if symptoms persist or worsen. If Influenza A AND B are positive on test, no need for flu vaccine this season. If only one or none is positive, please get your flu shot once you feel better.

## 2022-11-05 LAB
FLUAV + FLUBV RNA SPEC NAA+PROBE: DETECTED
FLUAV + FLUBV RNA SPEC NAA+PROBE: NOT DETECTED
RSV RNA SPEC NAA+PROBE: NOT DETECTED
SARS-COV-2 RNA RESP QL NAA+PROBE: NOT DETECTED

## 2022-12-20 DIAGNOSIS — I10 ESSENTIAL HYPERTENSION: ICD-10-CM

## 2022-12-20 RX ORDER — LOSARTAN POTASSIUM 50 MG/1
TABLET ORAL
Qty: 30 TABLET | Refills: 0 | Status: SHIPPED | OUTPATIENT
Start: 2022-12-20

## 2023-02-25 DIAGNOSIS — I10 ESSENTIAL HYPERTENSION: ICD-10-CM

## 2023-02-27 RX ORDER — LOSARTAN POTASSIUM 50 MG/1
50 TABLET ORAL DAILY
Qty: 30 TABLET | Refills: 0 | Status: SHIPPED | OUTPATIENT
Start: 2023-02-27

## 2023-03-30 DIAGNOSIS — I10 ESSENTIAL HYPERTENSION: ICD-10-CM

## 2023-03-31 RX ORDER — LOSARTAN POTASSIUM 50 MG/1
TABLET ORAL
Qty: 30 TABLET | Refills: 0 | Status: SHIPPED | OUTPATIENT
Start: 2023-03-31

## 2023-04-29 ENCOUNTER — HOSPITAL ENCOUNTER (OUTPATIENT)
Dept: MAMMOGRAPHY | Age: 63
Discharge: HOME | End: 2023-04-29
Attending: FAMILY MEDICINE
Payer: COMMERCIAL

## 2023-04-29 DIAGNOSIS — Z12.31 ENCOUNTER FOR SCREENING MAMMOGRAM FOR HIGH-RISK PATIENT: ICD-10-CM

## 2023-04-29 PROCEDURE — 77067 SCR MAMMO BI INCL CAD: CPT | Performed by: FAMILY MEDICINE

## 2023-04-29 PROCEDURE — 77063 BREAST TOMOSYNTHESIS BI: CPT | Performed by: FAMILY MEDICINE

## 2023-05-19 ENCOUNTER — OFFICE VISIT (OUTPATIENT)
Dept: FAMILY MEDICINE CLINIC | Facility: CLINIC | Age: 63
End: 2023-05-19
Payer: COMMERCIAL

## 2023-05-19 VITALS
HEIGHT: 65 IN | WEIGHT: 227 LBS | HEART RATE: 78 BPM | DIASTOLIC BLOOD PRESSURE: 80 MMHG | TEMPERATURE: 98 F | SYSTOLIC BLOOD PRESSURE: 130 MMHG | BODY MASS INDEX: 37.82 KG/M2 | RESPIRATION RATE: 18 BRPM | OXYGEN SATURATION: 98 %

## 2023-05-19 DIAGNOSIS — Z12.31 ENCOUNTER FOR SCREENING MAMMOGRAM FOR HIGH-RISK PATIENT: ICD-10-CM

## 2023-05-19 DIAGNOSIS — I10 ESSENTIAL HYPERTENSION: ICD-10-CM

## 2023-05-19 DIAGNOSIS — Z13.820 SCREENING FOR OSTEOPOROSIS: ICD-10-CM

## 2023-05-19 DIAGNOSIS — Z00.00 ANNUAL PHYSICAL EXAM: Primary | ICD-10-CM

## 2023-05-19 PROCEDURE — 90750 HZV VACC RECOMBINANT IM: CPT | Performed by: FAMILY MEDICINE

## 2023-05-19 PROCEDURE — 3008F BODY MASS INDEX DOCD: CPT | Performed by: FAMILY MEDICINE

## 2023-05-19 PROCEDURE — 3079F DIAST BP 80-89 MM HG: CPT | Performed by: FAMILY MEDICINE

## 2023-05-19 PROCEDURE — 99396 PREV VISIT EST AGE 40-64: CPT | Performed by: FAMILY MEDICINE

## 2023-05-19 PROCEDURE — 90471 IMMUNIZATION ADMIN: CPT | Performed by: FAMILY MEDICINE

## 2023-05-19 PROCEDURE — 3075F SYST BP GE 130 - 139MM HG: CPT | Performed by: FAMILY MEDICINE

## 2023-05-19 RX ORDER — LOSARTAN POTASSIUM 50 MG/1
50 TABLET ORAL DAILY
Qty: 90 TABLET | Refills: 2 | Status: SHIPPED | OUTPATIENT
Start: 2023-05-19

## 2023-06-03 ENCOUNTER — PATIENT MESSAGE (OUTPATIENT)
Dept: FAMILY MEDICINE CLINIC | Facility: CLINIC | Age: 63
End: 2023-06-03

## 2023-06-03 DIAGNOSIS — E53.8 VITAMIN B12 DEFICIENCY: ICD-10-CM

## 2023-06-03 DIAGNOSIS — E55.9 VITAMIN D DEFICIENCY: ICD-10-CM

## 2023-06-03 DIAGNOSIS — Z00.00 ROUTINE GENERAL MEDICAL EXAMINATION AT A HEALTH CARE FACILITY: Primary | ICD-10-CM

## 2023-06-05 NOTE — TELEPHONE ENCOUNTER
From: Edy Rubi  To: Pili Robb DO  Sent: 6/3/2023 12:29 PM CDT  Subject: Request for lab order    I missed my scheduled date for blood work 5/19/23 and wondering if you can re send it to [facility] and notify me. Thank you.

## 2023-06-08 LAB
ABSOLUTE BASOPHILS: 29 CELLS/UL (ref 0–200)
ABSOLUTE EOSINOPHILS: 58 CELLS/UL (ref 15–500)
ABSOLUTE LYMPHOCYTES: 2213 CELLS/UL (ref 850–3900)
ABSOLUTE MONOCYTES: 346 CELLS/UL (ref 200–950)
ABSOLUTE NEUTROPHILS: 2155 CELLS/UL (ref 1500–7800)
ALBUMIN/GLOBULIN RATIO: 1.3 (CALC) (ref 1–2.5)
ALBUMIN: 3.8 G/DL (ref 3.6–5.1)
ALKALINE PHOSPHATASE: 80 U/L (ref 37–153)
ALT: 36 U/L (ref 6–29)
AST: 21 U/L (ref 10–35)
BASOPHILS: 0.6 %
BILIRUBIN, TOTAL: 0.6 MG/DL (ref 0.2–1.2)
BUN: 13 MG/DL (ref 7–25)
CALCIUM: 9 MG/DL (ref 8.6–10.4)
CARBON DIOXIDE: 30 MMOL/L (ref 20–32)
CHLORIDE: 105 MMOL/L (ref 98–110)
CHOL/HDLC RATIO: 2.3 (CALC)
CHOLESTEROL, TOTAL: 185 MG/DL
CREATININE: 0.72 MG/DL (ref 0.5–1.05)
EGFR: 94 ML/MIN/1.73M2
EOSINOPHILS: 1.2 %
GLOBULIN: 2.9 G/DL (CALC) (ref 1.9–3.7)
GLUCOSE: 81 MG/DL (ref 65–99)
HDL CHOLESTEROL: 80 MG/DL
HEMATOCRIT: 38.4 % (ref 35–45)
HEMOGLOBIN A1C: 5.4 % OF TOTAL HGB
HEMOGLOBIN: 12.8 G/DL (ref 11.7–15.5)
LDL-CHOLESTEROL: 91 MG/DL (CALC)
LYMPHOCYTES: 46.1 %
MCH: 29.2 PG (ref 27–33)
MCHC: 33.3 G/DL (ref 32–36)
MCV: 87.5 FL (ref 80–100)
MONOCYTES: 7.2 %
MPV: 10.2 FL (ref 7.5–12.5)
NEUTROPHILS: 44.9 %
NON-HDL CHOLESTEROL: 105 MG/DL (CALC)
PLATELET COUNT: 271 THOUSAND/UL (ref 140–400)
POTASSIUM: 4.5 MMOL/L (ref 3.5–5.3)
PROTEIN, TOTAL: 6.7 G/DL (ref 6.1–8.1)
RDW: 13.5 % (ref 11–15)
RED BLOOD CELL COUNT: 4.39 MILLION/UL (ref 3.8–5.1)
SODIUM: 140 MMOL/L (ref 135–146)
T4, FREE: 1 NG/DL (ref 0.8–1.8)
TRIGLYCERIDES: 47 MG/DL
TSH: 1.22 MIU/L (ref 0.4–4.5)
VITAMIN B12: 677 PG/ML (ref 200–1100)
VITAMIN D, 25-OH, TOTAL: 32 NG/ML (ref 30–100)
WHITE BLOOD CELL COUNT: 4.8 THOUSAND/UL (ref 3.8–10.8)

## 2023-06-12 ENCOUNTER — PATIENT MESSAGE (OUTPATIENT)
Dept: FAMILY MEDICINE CLINIC | Facility: CLINIC | Age: 63
End: 2023-06-12

## 2023-06-12 DIAGNOSIS — E55.9 VITAMIN D DEFICIENCY: Primary | ICD-10-CM

## 2023-06-12 DIAGNOSIS — R74.8 LIVER ENZYME ELEVATION: ICD-10-CM

## 2023-06-13 NOTE — TELEPHONE ENCOUNTER
From: Emelia Santillan  Sent: 6/12/2023 6:34 PM CDT  To: [facility] Clinical Staff  Subject: lab report    So take 3000 or 6000?  I was on 3000 before

## 2024-03-18 DIAGNOSIS — I10 ESSENTIAL HYPERTENSION: ICD-10-CM

## 2024-03-19 RX ORDER — LOSARTAN POTASSIUM 50 MG/1
50 TABLET ORAL DAILY
Qty: 90 TABLET | Refills: 3 | Status: SHIPPED | OUTPATIENT
Start: 2024-03-19

## 2024-05-24 ENCOUNTER — TELEPHONE (OUTPATIENT)
Dept: FAMILY MEDICINE CLINIC | Facility: CLINIC | Age: 64
End: 2024-05-24

## 2024-05-24 DIAGNOSIS — E55.9 VITAMIN D DEFICIENCY: ICD-10-CM

## 2024-05-24 DIAGNOSIS — Z12.31 ENCOUNTER FOR SCREENING MAMMOGRAM FOR BREAST CANCER: ICD-10-CM

## 2024-05-24 DIAGNOSIS — I10 ESSENTIAL HYPERTENSION: Primary | ICD-10-CM

## 2024-05-24 DIAGNOSIS — Z00.00 ROUTINE GENERAL MEDICAL EXAMINATION AT A HEALTH CARE FACILITY: ICD-10-CM

## 2024-05-24 DIAGNOSIS — E04.9 GOITER: ICD-10-CM

## 2024-05-24 DIAGNOSIS — Z00.00 ANNUAL PHYSICAL EXAM: ICD-10-CM

## 2024-05-24 DIAGNOSIS — E53.8 VITAMIN B12 DEFICIENCY: ICD-10-CM

## 2024-05-24 DIAGNOSIS — Z13.820 SCREENING FOR OSTEOPOROSIS: ICD-10-CM

## 2024-06-06 ENCOUNTER — HOSPITAL ENCOUNTER (OUTPATIENT)
Dept: BONE DENSITY | Age: 64
Discharge: HOME | End: 2024-06-06
Attending: FAMILY MEDICINE
Payer: COMMERCIAL

## 2024-06-06 ENCOUNTER — HOSPITAL ENCOUNTER (OUTPATIENT)
Dept: MAMMOGRAPHY | Age: 64
Discharge: HOME | End: 2024-06-06
Attending: FAMILY MEDICINE
Payer: COMMERCIAL

## 2024-06-06 DIAGNOSIS — Z13.820 SCREENING FOR OSTEOPOROSIS: ICD-10-CM

## 2024-06-06 DIAGNOSIS — Z12.31 ENCOUNTER FOR SCREENING MAMMOGRAM FOR BREAST CANCER: ICD-10-CM

## 2024-06-06 PROCEDURE — 77063 BREAST TOMOSYNTHESIS BI: CPT | Performed by: FAMILY MEDICINE

## 2024-06-06 PROCEDURE — 77067 SCR MAMMO BI INCL CAD: CPT | Performed by: FAMILY MEDICINE

## 2024-06-06 PROCEDURE — 77080 DXA BONE DENSITY AXIAL: CPT | Performed by: FAMILY MEDICINE

## 2024-06-13 LAB
ABSOLUTE BASOPHILS: 30 CELLS/UL (ref 0–200)
ABSOLUTE EOSINOPHILS: 78 CELLS/UL (ref 15–500)
ABSOLUTE LYMPHOCYTES: 2688 CELLS/UL (ref 850–3900)
ABSOLUTE MONOCYTES: 378 CELLS/UL (ref 200–950)
ABSOLUTE NEUTROPHILS: 2826 CELLS/UL (ref 1500–7800)
ALBUMIN/GLOBULIN RATIO: 1.4 (CALC) (ref 1–2.5)
ALBUMIN: 4 G/DL (ref 3.6–5.1)
ALKALINE PHOSPHATASE: 92 U/L (ref 37–153)
ALT: 30 U/L (ref 6–29)
AST: 21 U/L (ref 10–35)
BASOPHILS: 0.5 %
BILIRUBIN, TOTAL: 0.4 MG/DL (ref 0.2–1.2)
BUN: 16 MG/DL (ref 7–25)
CALCIUM: 9.3 MG/DL (ref 8.6–10.4)
CARBON DIOXIDE: 25 MMOL/L (ref 20–32)
CHLORIDE: 105 MMOL/L (ref 98–110)
CHOL/HDLC RATIO: 2 (CALC)
CHOLESTEROL, TOTAL: 169 MG/DL
CREATININE: 0.74 MG/DL (ref 0.5–1.05)
EGFR: 90 ML/MIN/1.73M2
EOSINOPHILS: 1.3 %
GLOBULIN: 2.9 G/DL (CALC) (ref 1.9–3.7)
GLUCOSE: 94 MG/DL (ref 65–99)
HDL CHOLESTEROL: 85 MG/DL
HEMATOCRIT: 42.2 % (ref 35–45)
HEMOGLOBIN A1C: 5.8 % OF TOTAL HGB
HEMOGLOBIN: 13.3 G/DL (ref 11.7–15.5)
LDL-CHOLESTEROL: 71 MG/DL (CALC)
LYMPHOCYTES: 44.8 %
MCH: 28 PG (ref 27–33)
MCHC: 31.5 G/DL (ref 32–36)
MCV: 88.8 FL (ref 80–100)
MONOCYTES: 6.3 %
MPV: 10.1 FL (ref 7.5–12.5)
NEUTROPHILS: 47.1 %
NON-HDL CHOLESTEROL: 84 MG/DL (CALC)
PLATELET COUNT: 278 THOUSAND/UL (ref 140–400)
POTASSIUM: 4.5 MMOL/L (ref 3.5–5.3)
PROTEIN, TOTAL: 6.9 G/DL (ref 6.1–8.1)
RDW: 14 % (ref 11–15)
RED BLOOD CELL COUNT: 4.75 MILLION/UL (ref 3.8–5.1)
SODIUM: 141 MMOL/L (ref 135–146)
T4, FREE: 1 NG/DL (ref 0.8–1.8)
TRIGLYCERIDES: 47 MG/DL
TSH: 1.36 MIU/L (ref 0.4–4.5)
VITAMIN B12: 586 PG/ML (ref 200–1100)
VITAMIN D, 25-OH, TOTAL: 30 NG/ML (ref 30–100)
WHITE BLOOD CELL COUNT: 6 THOUSAND/UL (ref 3.8–10.8)

## 2024-06-14 ENCOUNTER — OFFICE VISIT (OUTPATIENT)
Dept: FAMILY MEDICINE CLINIC | Facility: CLINIC | Age: 64
End: 2024-06-14
Payer: COMMERCIAL

## 2024-06-14 VITALS
OXYGEN SATURATION: 99 % | DIASTOLIC BLOOD PRESSURE: 70 MMHG | RESPIRATION RATE: 16 BRPM | BODY MASS INDEX: 42.65 KG/M2 | SYSTOLIC BLOOD PRESSURE: 144 MMHG | HEART RATE: 80 BPM | HEIGHT: 65 IN | WEIGHT: 256 LBS

## 2024-06-14 DIAGNOSIS — Z00.00 ANNUAL PHYSICAL EXAM: ICD-10-CM

## 2024-06-14 DIAGNOSIS — Z01.419 WELL WOMAN EXAM WITH ROUTINE GYNECOLOGICAL EXAM: Primary | ICD-10-CM

## 2024-06-14 DIAGNOSIS — R06.83 SNORING: ICD-10-CM

## 2024-06-14 PROCEDURE — 3077F SYST BP >= 140 MM HG: CPT | Performed by: FAMILY MEDICINE

## 2024-06-14 PROCEDURE — 99396 PREV VISIT EST AGE 40-64: CPT | Performed by: FAMILY MEDICINE

## 2024-06-14 PROCEDURE — 3008F BODY MASS INDEX DOCD: CPT | Performed by: FAMILY MEDICINE

## 2024-06-14 PROCEDURE — 3078F DIAST BP <80 MM HG: CPT | Performed by: FAMILY MEDICINE

## 2024-06-14 NOTE — PROGRESS NOTES
HPI:   Treasure Gudino is a 64 year old female who presents for a complete physical exam.      Wt Readings from Last 6 Encounters:   24 256 lb (116.1 kg)   23 227 lb (103 kg)   22 232 lb (105.2 kg)   22 245 lb (111.1 kg)   22 245 lb (111.1 kg)   21 240 lb (108.9 kg)     Body mass index is 42.6 kg/m².          last pap  all previous paps normal  No vaginal discharge  No bladder dysfunction  No vaginal bleeding;  postmenopausal   No vaginal dryness   some hot flashes  + performing self breast exams   last mammogram   dexa- discussed results / no osteoporosis in the family    colo G   No calcium and vit D supplementation  No family history of breast colon or ovarian cancer  Minimal exercise        Current Outpatient Medications   Medication Sig Dispense Refill    losartan 50 MG Oral Tab Take 1 tablet (50 mg total) by mouth daily. 90 tablet 3      Past Medical History:    Essential hypertension    Immunization, BCG    Lipid screening      Past Surgical History:   Procedure Laterality Date            Family History   Problem Relation Age of Onset    Diabetes Brother         DM    Hypertension Mother     Diabetes Brother       Social History:   Social History     Socioeconomic History    Marital status:    Tobacco Use    Smoking status: Never    Smokeless tobacco: Never   Substance and Sexual Activity    Alcohol use: Yes    Drug use: No   Other Topics Concern    Caffeine Concern Yes    Exercise No    Seat Belt Yes     Occ:  :  Children: 2   now retired   Exercise: minimal.  Diet: monitoring      REVIEW OF SYSTEMS:   GENERAL: feels well otherwise  SKIN: denies any unusual skin lesions  EYES:denies blurred vision or double vision  HEENT: denies nasal congestion, sinus pain or ST  LUNGS: denies shortness of breath with exertion  CARDIOVASCULAR: denies chest pain on exertion  GI: denies abdominal pain,denies heartburn  : denies  dysuria, vaginal discharge or itching   MUSCULOSKELETAL: denies back pain  NEURO: denies headaches  PSYCHE: denies depression or anxiety  HEMATOLOGIC: denies hx of anemia  ENDOCRINE: denies thyroid history  ALL/ASTHMA: denies hx of allergy or asthma    EXAM:   /70   Pulse 80   Resp 16   Ht 5' 5\" (1.651 m)   Wt 256 lb (116.1 kg)   LMP 01/26/2010   SpO2 99%   BMI 42.60 kg/m²   Body mass index is 42.6 kg/m².   GENERAL: alert and oriented X 3, well developed, well nourished,in no apparent distress  CARDIO: RRR without murmur  LUNGS: clear to auscultation  NECK: supple,no adenopathy, no thyromegaly   HEENT: atraumatic, normocephalic,ears and throat are clear  EYES:PERRLA, EOMI, normal,conjunctiva are clear  SKIN: norashes,no suspicious lesions  GI: good BS's,no masses, HSM or tenderness  CHEST: no chest tenderness  BREAST: no axillary LAD, no masses no nipple discharge bilaterally  ((: external genitalia - no inguinal LAD, no lesions.  Speculum exam- introitus is normal,scant discharge,cervix is pink. Bimanual exam- no adnexal masses or tenderness  RECTAL:good rectal tone,no mass, brown stool, stool is OB negative)) deferred   MUSCULOSKELETAL: back is not tender,FROM of the back  EXTREMITIES: no cyanosis, clubbing or edema  NEURO: cranial nerves are intact,motor and sensory are grossly intact    ASSESSMENT AND PLAN:   Treasure Gudino is a 64 year old female who presents with     1. Well woman exam with routine gynecological exam      2. Annual physical exam    - Hemoglobin A1C; Future    3. Snoring    - OP REFERRAL TO DIAGNOSTIC SLEEP STUDY      Discussed diet, exercise,calcium, vitamin D, fish oil and self breast exams.   Questions answered and patient indicates understanding of these issues and agrees to the plan.  Follow up in 6 mo or sooner if needed.                                                                                                                                                                                                                                                                                                                                                                                                                                                                                                                      HPI:   Treasure Gudino is a 64 year old female who presents for a complete physical exam.      Wt Readings from Last 6 Encounters:   24 256 lb (116.1 kg)   23 227 lb (103 kg)   22 232 lb (105.2 kg)   22 245 lb (111.1 kg)   22 245 lb (111.1 kg)   21 240 lb (108.9 kg)     Body mass index is 42.6 kg/m².          last pap  all previous paps normal  No vaginal discharge  No bladder dysfunction  No vaginal bleeding;  postmenopausal   No vaginal dryness   some hot flashes  + performing self breast exams  utd last mammogram   dexa  Never had colonoscopy -    No calcium and vit D supplementation  No family history of breast colon or ovarian cancer  Minimal exercise        Current Outpatient Medications   Medication Sig Dispense Refill    losartan 50 MG Oral Tab Take 1 tablet (50 mg total) by mouth daily. 90 tablet 3      Past Medical History:    Essential hypertension    Immunization, BCG    Lipid screening      Past Surgical History:   Procedure Laterality Date      ,      Family History   Problem Relation Age of Onset    Diabetes Brother         DM    Hypertension Mother     Diabetes Brother       Social History:   Social History     Socioeconomic History    Marital status:    Tobacco Use    Smoking status: Never    Smokeless tobacco: Never   Substance and Sexual Activity    Alcohol use: Yes    Drug use: No   Other Topics Concern    Caffeine Concern Yes     Exercise No    Seat Belt Yes     Occ:  :  Children: 2   now retired   Exercise: minimal.  Diet: monitoring      REVIEW OF SYSTEMS:   GENERAL: feels well otherwise  SKIN: denies any unusual skin lesions  EYES:denies blurred vision or double vision  HEENT: denies nasal congestion, sinus pain or ST  LUNGS: denies shortness of breath with exertion  CARDIOVASCULAR: denies chest pain on exertion  GI: denies abdominal pain,denies heartburn  : denies dysuria, vaginal discharge or itching   MUSCULOSKELETAL: denies back pain  NEURO: denies headaches  PSYCHE: denies depression or anxiety  HEMATOLOGIC: denies hx of anemia  ENDOCRINE: denies thyroid history  ALL/ASTHMA: denies hx of allergy or asthma    EXAM:   /70   Pulse 80   Resp 16   Ht 5' 5\" (1.651 m)   Wt 256 lb (116.1 kg)   LMP 01/26/2010   SpO2 99%   BMI 42.60 kg/m²   Body mass index is 42.6 kg/m².   GENERAL: alert and oriented X 3, well developed, well nourished,in no apparent distress  CARDIO: RRR without murmur  LUNGS: clear to auscultation  NECK: supple,no adenopathy, no thyromegaly   HEENT: atraumatic, normocephalic,ears and throat are clear  EYES:PERRLA, EOMI, normal,conjunctiva are clear  SKIN: norashes,no suspicious lesions  GI: good BS's,no masses, HSM or tenderness  CHEST: no chest tenderness  BREAST: no axillary LAD, no masses no nipple discharge bilaterally  ((: external genitalia - no inguinal LAD, no lesions.  Speculum exam- introitus is normal,scant discharge,cervix is pink. Bimanual exam- no adnexal masses or tenderness  RECTAL:good rectal tone,no mass, brown stool, stool is OB negative)) deferred   MUSCULOSKELETAL: back is not tender,FROM of the back  EXTREMITIES: no cyanosis, clubbing or edema  NEURO: cranial nerves are intact,motor and sensory are grossly intact    ASSESSMENT AND PLAN:   Treasure Gudino is a 64 year old female who presents with   1. Annual physical exam    - VITAMIN D; Future  - FREE T4 (FREE THYROXINE);  Future  - ASSAY, THYROID STIM HORMONE; Future  - LIPID PANEL; Future  - CBC WITH DIFFERENTIAL WITH PLATELET; Future  - VITAMIN B12; Future  - COMP METABOLIC PANEL (14); Future  - HEMOGLOBIN A1C; Future    2. Encounter for screening mammogram for high-risk patient    - [facility] DOUG 2D+3D SCREENING BILAT (CPT=77067/28742); Future    3. Screening for osteoporosis    - XR DEXA BONE DENSITOMETRY (CPT=77080); Future    4. Essential hypertension    - losartan 50 MG Oral Tab; Take 1 tablet (50 mg total) by mouth daily.  Dispense: 90 tablet; Refill: 2      Discussed diet, exercise,calcium, vitamin D, fish oil and self breast exams.   Questions answered and patient indicates understanding of these issues and agrees to the plan.  Follow up in 6 mo or sooner if needed.

## 2024-06-17 LAB — HPV MRNA E6/E7: NOT DETECTED

## 2024-08-06 ENCOUNTER — HOSPITAL ENCOUNTER (EMERGENCY)
Age: 64
Discharge: HOME | End: 2024-08-07
Attending: EMERGENCY MEDICINE
Payer: COMMERCIAL

## 2024-08-06 DIAGNOSIS — R10.9 ABDOMINAL PAIN OF UNKNOWN ETIOLOGY: Primary | ICD-10-CM

## 2024-08-06 LAB
ALBUMIN SERPL-MCNC: 3.4 G/DL (ref 3.4–5)
ALBUMIN/GLOB SERPL: 0.8 {RATIO} (ref 1–2)
ALP LIVER SERPL-CCNC: 98 U/L
ALT SERPL-CCNC: 41 U/L
ANION GAP SERPL CALC-SCNC: 5 MMOL/L (ref 0–18)
AST SERPL-CCNC: 21 U/L (ref 15–37)
BASOPHILS # BLD AUTO: 0.01 X10(3) UL (ref 0–0.2)
BASOPHILS NFR BLD AUTO: 0.1 %
BILIRUB SERPL-MCNC: 0.4 MG/DL (ref 0.1–2)
BILIRUB UR QL STRIP.AUTO: NEGATIVE
BUN BLD-MCNC: 9 MG/DL (ref 9–23)
CALCIUM BLD-MCNC: 9.3 MG/DL (ref 8.5–10.1)
CHLORIDE SERPL-SCNC: 104 MMOL/L (ref 98–112)
CLARITY UR REFRACT.AUTO: CLEAR
CO2 SERPL-SCNC: 26 MMOL/L (ref 21–32)
COLOR UR AUTO: YELLOW
CREAT BLD-MCNC: 0.78 MG/DL
EGFRCR SERPLBLD CKD-EPI 2021: 85 ML/MIN/1.73M2 (ref 60–?)
EOSINOPHIL # BLD AUTO: 0.02 X10(3) UL (ref 0–0.7)
EOSINOPHIL NFR BLD AUTO: 0.2 %
ERYTHROCYTE [DISTWIDTH] IN BLOOD BY AUTOMATED COUNT: 14.9 %
GLOBULIN PLAS-MCNC: 4.2 G/DL (ref 2.8–4.4)
GLUCOSE BLD-MCNC: 129 MG/DL (ref 70–99)
GLUCOSE UR STRIP.AUTO-MCNC: NEGATIVE MG/DL
HCT VFR BLD AUTO: 40.1 %
HGB BLD-MCNC: 13.3 G/DL
IMM GRANULOCYTES # BLD AUTO: 0.02 X10(3) UL (ref 0–1)
IMM GRANULOCYTES NFR BLD: 0.2 %
KETONES UR STRIP.AUTO-MCNC: NEGATIVE MG/DL
LIPASE SERPL-CCNC: 13 U/L (ref 12–53)
LYMPHOCYTES # BLD AUTO: 1.38 X10(3) UL (ref 1–4)
LYMPHOCYTES NFR BLD AUTO: 15.9 %
MCH RBC QN AUTO: 28.9 PG (ref 26–34)
MCHC RBC AUTO-ENTMCNC: 33.2 G/DL (ref 31–37)
MCV RBC AUTO: 87.2 FL
MONOCYTES # BLD AUTO: 0.33 X10(3) UL (ref 0.1–1)
MONOCYTES NFR BLD AUTO: 3.8 %
NEUTROPHILS # BLD AUTO: 6.93 X10 (3) UL (ref 1.5–7.7)
NEUTROPHILS # BLD AUTO: 6.93 X10(3) UL (ref 1.5–7.7)
NEUTROPHILS NFR BLD AUTO: 79.8 %
NITRITE UR QL STRIP.AUTO: NEGATIVE
OSMOLALITY SERPL CALC.SUM OF ELEC: 280 MOSM/KG (ref 275–295)
PH UR STRIP.AUTO: 6.5 [PH] (ref 5–8)
PLATELET # BLD AUTO: 270 10(3)UL (ref 150–450)
POTASSIUM SERPL-SCNC: 4 MMOL/L (ref 3.5–5.1)
PROT SERPL-MCNC: 7.6 G/DL (ref 6.4–8.2)
PROT UR STRIP.AUTO-MCNC: NEGATIVE MG/DL
RBC # BLD AUTO: 4.6 X10(6)UL
RBC UR QL AUTO: NEGATIVE
SODIUM SERPL-SCNC: 135 MMOL/L (ref 136–145)
SP GR UR STRIP.AUTO: 1.01 (ref 1–1.03)
UROBILINOGEN UR STRIP.AUTO-MCNC: 0.2 MG/DL
WBC # BLD AUTO: 8.7 X10(3) UL (ref 4–11)

## 2024-08-06 PROCEDURE — 81001 URINALYSIS AUTO W/SCOPE: CPT | Performed by: EMERGENCY MEDICINE

## 2024-08-06 PROCEDURE — 87086 URINE CULTURE/COLONY COUNT: CPT | Performed by: EMERGENCY MEDICINE

## 2024-08-06 PROCEDURE — 85025 COMPLETE CBC W/AUTO DIFF WBC: CPT | Performed by: EMERGENCY MEDICINE

## 2024-08-06 PROCEDURE — 99284 EMERGENCY DEPT VISIT MOD MDM: CPT

## 2024-08-06 PROCEDURE — 80053 COMPREHEN METABOLIC PANEL: CPT | Performed by: EMERGENCY MEDICINE

## 2024-08-06 PROCEDURE — 36415 COLL VENOUS BLD VENIPUNCTURE: CPT

## 2024-08-06 PROCEDURE — 81015 MICROSCOPIC EXAM OF URINE: CPT | Performed by: EMERGENCY MEDICINE

## 2024-08-06 PROCEDURE — 83690 ASSAY OF LIPASE: CPT | Performed by: EMERGENCY MEDICINE

## 2024-08-07 ENCOUNTER — APPOINTMENT (OUTPATIENT)
Dept: ULTRASOUND IMAGING | Age: 64
End: 2024-08-07
Attending: EMERGENCY MEDICINE
Payer: COMMERCIAL

## 2024-08-07 VITALS
TEMPERATURE: 98 F | DIASTOLIC BLOOD PRESSURE: 76 MMHG | OXYGEN SATURATION: 98 % | WEIGHT: 248 LBS | RESPIRATION RATE: 16 BRPM | HEART RATE: 76 BPM | BODY MASS INDEX: 42.34 KG/M2 | SYSTOLIC BLOOD PRESSURE: 135 MMHG | HEIGHT: 64 IN

## 2024-08-07 PROCEDURE — 76705 ECHO EXAM OF ABDOMEN: CPT | Performed by: EMERGENCY MEDICINE

## 2024-08-07 NOTE — ED INITIAL ASSESSMENT (HPI)
Patient here with periumbilical abdominal pain that started approximately two hours ago. Described as squeezing. Denies N/V/D or fever.

## 2024-08-07 NOTE — ED PROVIDER NOTES
Patient Seen in: [facility] In [place]      History     Chief Complaint   Patient presents with    Abdomen/Flank Pain     Stated Complaint: Abdominal pain x2 hours ago - denies NVD    Subjective:   HPI    Patient comes to the emergency department with periumbilical pain.  She states that it started approximately 6:30 PM.  She did not eat prior to the onset of the pain.  The patient states that the pain is described as a twisting sensation in her periumbilical region which last for a few minutes and then resolves.  She states that she had several episodes throughout the evening with the last episode approximately an hour prior to my evaluation of the patient.  She had no associated fever or chills.  She had no flank discomfort.  She has had no vomiting.  She has had no change in bowel movements.  She states that she had 2 bowel movements today thinking that might help her discomfort, but the bowel movements were normal and her pain continued.  She has no previous history of similar symptoms.  She has not never had any abdominal surgery other than  sections.    Objective:   Past Medical History:    Essential hypertension    Immunization, BCG    Lipid screening              Past Surgical History:   Procedure Laterality Date                      Social History     Socioeconomic History    Marital status:    Tobacco Use    Smoking status: Never    Smokeless tobacco: Never   Substance and Sexual Activity    Alcohol use: Yes    Drug use: No   Other Topics Concern    Caffeine Concern Yes    Exercise No    Seat Belt Yes              Review of Systems    Positive for stated Chief Complaint: Abdomen/Flank Pain    Other systems are as noted in HPI.  Constitutional and vital signs reviewed.      All other systems reviewed and negative except as noted above.    Physical Exam     ED Triage Vitals [24]   BP (!) 175/93   Pulse 85   Resp 18   Temp 98.2 °F (36.8 °C)  [address] [place] [address]     Department of Internal Medicine - Staff Internal Medicine Service    INPATIENT DISCHARGE SUMMARY        Patient Identification:  Raquel Schneider is a 46 y.o. female. :  1966  MRN: [id number]     Acct: [de-identified]   Admit Date:  2019  Discharge date and time: 2019 12:54 PM   Attending Provider: No att. providers found                                     Admission Diagnoses:   Shock ([address] [R57.9]    Discharge Diagnoses:   Principal Problem:    Septic shock ([address]  Active Problems:    Shock ([address]    Rhabdomyolysis    Hyponatremia    Lactic acidosis    MARY (acute kidney injury) ([address]    Metabolic acidosis    Acute respiratory failure (HCC)    Elevated liver enzymes    Hyperkalemia    Ischemic necrosis of large intestine (HCC)    Ischemic bowel syndrome (HCC)    Acute GI bleeding    Gram negative septic shock (HCC)    Protein-calorie malnutrition (HCC)    Gastroenteritis    Haemophilus influenzae septicemia (HCC)    Pleural effusion, bilateral    Splenic infarct    Leukocytosis    Mottled skin    Acute renal failure (HCC)    Altered mental status    PMB (postmenopausal bleeding)    Thrombocytosis ([address]  Resolved Problems:    * No resolved hospital problems. *       Consults: ObGyn, hematology oncology, vascular surgery, cardiology, general surgery, nephrology, infectious diseases    Brief Inpatient course: The patient came into the hospital with complaints of vomiting, abdominal pain and diarrhea for the last few days. The patient also had increasing confusion and presented to the hospital with altered mental state. She was hypotensive, received fluid bolus in the ED. She was severely acidotic with lactic acidosis. She also had hyperkalemia due to acute renal failure area. She had mottling of her lower extremities. She had a CT chest concerning aortic dissection which was ruled out. CTA Abdomen also ruled out aortic dissection but showed ileus.  It showed   Temp src Oral   SpO2 99 %   O2 Device None (Room air)       Current Vitals:   Vital Signs  BP: 135/76  Pulse: 76  Resp: 16  Temp: 98.2 °F (36.8 °C)  Temp src: Oral    Oxygen Therapy  SpO2: 98 %  O2 Device: None (Room air)            Physical Exam  Vitals and nursing note reviewed.   Constitutional:       Appearance: She is well-developed.   HENT:      Head: Normocephalic.   Cardiovascular:      Rate and Rhythm: Normal rate and regular rhythm.      Heart sounds: Normal heart sounds. No murmur heard.  Pulmonary:      Effort: Pulmonary effort is normal. No respiratory distress.      Breath sounds: Normal breath sounds.   Abdominal:      General: Bowel sounds are normal.      Palpations: Abdomen is soft.      Tenderness: There is no abdominal tenderness. There is no rebound.      Comments: Patient has absolutely no discomfort to palpation.  Abdomen is completely benign.   Musculoskeletal:         General: No tenderness. Normal range of motion.      Cervical back: Normal range of motion and neck supple.   Lymphadenopathy:      Cervical: No cervical adenopathy.   Skin:     General: Skin is warm and dry.      Findings: No rash.   Neurological:      Mental Status: She is alert and oriented to person, place, and time.      Sensory: No sensory deficit.              ED Course     Labs Reviewed   COMP METABOLIC PANEL (14) - Abnormal; Notable for the following components:       Result Value    Glucose 129 (*)     Sodium 135 (*)     A/G Ratio 0.8 (*)     All other components within normal limits   URINALYSIS WITH CULTURE REFLEX - Abnormal; Notable for the following components:    Leukocyte Esterase Urine Small (*)     All other components within normal limits   UA MICROSCOPIC ONLY, URINE - Abnormal; Notable for the following components:    Bacteria Urine Rare (*)     Squamous Epi. Cells Few (*)     All other components within normal limits   LIPASE - Normal   CBC WITH DIFFERENTIAL WITH PLATELET   URINE CULTURE, ROUTINE         dilated bowel loops consistent with Ileus. Because of hyperkalemia, the patient was started on bicarbonate drip. She required Levophed support and later with vasopressin as well. She got a dialyzing catheter during the night and was not able to tolerate the dialysis procedure as the flow was collapsing. She was found to be positive for cocaine on urinary drug screen. Troponins continued to worsen from 80 to128 secondary to renal failure and liver failure. She had developed rhabdomyolysis with increasing myoglobin and creatine kinases as well as creatinine going up. On initial presentation to the hospital, the patient's creatinine was 4.06, calcium was 5.2, sodium was 125, potassium 8.0. ALT and AST were elevated. Lactic acid was 5.7. The patient was given vancomycin and Levaquin in the ED. Initial W BC count was 23. 5.     Nephrology was consulted in the ED and they recommended emergent dialysis. Dialysis catheter was placed but the patient could not tolerated the procedure as the flow was collapsing.     Surgery was consulted for concerns of bowel ischemia. Abdominal x-ray was done which showed dilated bowel loop but no signs of perforation. She was given continuous IV fluids and levophed. Exploratory laparotomy was performed with subtotal colectomy and ileocecetcomy with wound vac placement. Extended left hemicolectomy. She was taken to OR again after 1 or 2 days for second look exploratory laparotomy, resection of the transverse colon, ileostomy creation, resection of rectal stump, abdominal washout and abdominal wall failure. General surgery followed the patient for wound VAC and an ileostomy. Wound bed. Healing in nature. No evidence of fascial dehiscence. Wound did not appear to be edematous, no drainage noted or any evidence of infectious process. Gen. surgery signed off recommending wound care for wound VAC changes and stoma care.     Infectious disease was consulted.   The patient had   ED Course as of 08/07/24 0928  ------------------------------------------------------------  Time: 08/07 0005  Comment: Urinalysis, CBC, chemistries and lipase were all negative  ------------------------------------------------------------  Time: 08/07 0030  Comment: Patient's abdomen was reexamined and was still without any tenderness.  ------------------------------------------------------------  Time: 08/07 0130  Value: US GALLBLADDER (CPT=76705)  Comment: No evidence of stone or obstruction.              MDM      Patient arrives with colicky abdominal pain that occurred multiple times since 630 pm. Differential included acute cholecystitis, acute pancreatitis, bowel obstruction and renal colic. Patient's labwork and ultrasound were unremarkable and patient remained asymptomatic during the remainder of the stay after initial evaluation. She had no abdominal tenderness. The patient was discharged with follow up with her primary physician. She was instructed to return for any acute change or worsening of symptoms.                                   Medical Decision Making      Disposition and Plan     Clinical Impression:  1. Abdominal pain of unknown etiology         Disposition:  Discharge  8/7/2024  1:41 am    Follow-up:  Susan Smith DO  [address] [place] [address] [id number] [phone]    Call in 1 day(s)            Medications Prescribed:  Discharge Medication List as of 8/7/2024  1:48 AM                                          severe penicillin allergy history. She was started on azactam to cover gram-negative organisms with levofloxacin. Azactam was later on changed to meropenem 1 g IV every 24 hours. Levofloxacin was later on discontinued. The patient was Off antibiotics until discharge.     An NG tube was placed which was draining black tarry material.  She also had generally current stools on wiping the part this morning. On April 7, the patient went into atrial fibrillation with RVR and was given digoxin 0.25 but no response and remained tachycardic. Her electrolytes are low and she was getting replacement. Blood cultures grew gram-negative rods H influenza. Cardiology was consulted for atrial fibrillation. She received a bolus of 150 mg amiodarone. She was also cardioverted because of hemodynamic instability but went into A. fib with RVR within 1 minute. She was not given anticoagulation because of coagulopathy.     The patient smokes half a pack a day is a social drinker but denies any use of recreational drugs. On April 9, she had an episode of hypoglycemia so the IV fluids were changed to D5. The patient got her dialysis on April 10th and was extubated as well. She remained on BiPAP. Was started on low-dose metoprolol by cardiology. Patient had complaints of flank pain. CT scan of the abdomen and pelvis was done which showed possible splenic infarct versus abscess. Unlikely to be ischemic in nature based on the presence of mottling in the hands and feet bilaterally. The patient was started on heparin drip. Vascular surgery was consulted but no acute surgical intervention required at that time. Patient had elevated WBCs during the entire stay in the hospital.  She spiked fever a few times which was managed by Tylenol. The patient's infectious workup was totally negative. She had increased output from the stoma on alternate days. She was started on Imodium, Metamucil.   Hypercoagulable workup was done which patch, R-3Normal      chlorhexidine (PERIDEX) 0.12 % solution Take 15 mLs by mouth 2 times daily for 14 days, Disp-420 mL, R-0Normal      midodrine (PROAMATINE) 5 MG tablet Take 1 tablet by mouth 3 times daily (with meals) Give if SBP < 100, Disp-90 tablet, R-3Normal      cyclobenzaprine (FLEXERIL) 5 MG tablet Take 1 tablet by mouth 3 times daily for 10 days, Disp-30 tablet, R-0Normal      pantoprazole (PROTONIX) 40 MG tablet Take 1 tablet by mouth 2 times daily (before meals), Disp-30 tablet, R-3Normal         CONTINUE these medications which have CHANGED    Details   ALPRAZolam (XANAX) 0.5 MG tablet Take 1 tablet by mouth 2 times daily as needed for Anxiety for up to 30 days. , Disp-10 tablet, R-0Print      warfarin (COUMADIN) 2.5 MG tablet Patient needs warfarin coumadin dose by the physician or a pharmacist. Close monitoring of INR required, Disp-30 tablet, R-3Normal         CONTINUE these medications which have NOT CHANGED    Details   albuterol (PROVENTIL) (2.5 MG/3ML) 0.083% nebulizer solution Take 2.5 mg by nebulization every 6 hours as needed for Wheezing      albuterol sulfate  (90 BASE) MCG/ACT inhaler Inhale 2 puffs into the lungs every 6 hours as needed for Wheezing         STOP taking these medications       ibuprofen (ADVIL;MOTRIN) 800 MG tablet Comments:   Reason for Stopping:         celecoxib (CELEBREX) 200 MG capsule Comments:   Reason for Stopping:         traMADol (ULTRAM) 50 MG tablet Comments:   Reason for Stopping:         oxyCODONE-acetaminophen (PERCOCET) 5-325 MG per tablet Comments:   Reason for Stopping:         diclofenac (VOLTAREN) 75 MG EC tablet Comments:   Reason for Stopping:         oxyCODONE-acetaminophen (PERCOCET) 5-325 MG per tablet Comments:   Reason for Stopping:         aspirin 325 MG EC tablet Comments:   Reason for Stopping:         Misc.  Devices (ROLLER [place]) MISC Comments:   Reason for Stopping:         QVAR 40 MCG/ACT inhaler Comments:   Reason for Stopping: Calcium Citrate-Vitamin D (CALCIUM + D PO) Comments:   Reason for Stopping:         Misc. Devices MISC Comments:   Reason for Stopping:         diclofenac (VOLTAREN) 50 MG EC tablet Comments:   Reason for Stopping:         docusate sodium (COLACE) 100 MG capsule Comments:   Reason for Stopping:         gabapentin (NEURONTIN) 100 MG capsule Comments:   Reason for Stopping:         venlafaxine (EFFEXOR) 75 MG tablet Comments:   Reason for Stopping:         lisinopril-hydrochlorothiazide (PRINZIDE;ZESTORETIC) 10-12.5 MG per tablet Comments:   Reason for Stopping:               Activity: activity as tolerated    Diet: renal diet    Follow-up:    [address]  [address] [id number] [phone]  Schedule an appointment as soon as possible for a visit in 2 weeks  follow up in [place] [address] in 1-2 weeks    Abi Hobbs MD  [address] [facility] [address]  [phone]    In 2 weeks  For outpatient follow up for MD Luz Elena  [address] [facility] [address] [facility]  [phone]      For follow up of Splenic Infarct and High platelet count.  VOLODYMYR 2 mutation order was sent    [place] , [address] [facility] [address] [facility] [id number] [phone]      For follow up of Entometrial [address], [address] [facility], [facility] [address]  [phone]      For follow up of Infectious Disease      Follow up labs: BMP    Follow up imaging:     Note that over 30 minutes was spent in preparing discharge papers, discussing discharge with patient, medication review, etc.      Narcisa Chen MD         Department of Internal Medicine  [facility]         5/8/2019, 3:27 PM

## 2024-08-07 NOTE — DISCHARGE INSTRUCTIONS
Tests including labs and ultrasound are all normal here.  Follow-up with your primary care doctor.

## 2025-03-14 DIAGNOSIS — I10 ESSENTIAL HYPERTENSION: ICD-10-CM

## 2025-03-14 RX ORDER — LOSARTAN POTASSIUM 50 MG/1
50 TABLET ORAL DAILY
Qty: 90 TABLET | Refills: 3 | Status: SHIPPED | OUTPATIENT
Start: 2025-03-14

## 2025-08-19 ENCOUNTER — OFFICE VISIT (OUTPATIENT)
Dept: FAMILY MEDICINE CLINIC | Facility: CLINIC | Age: 65
End: 2025-08-19

## 2025-08-19 VITALS
BODY MASS INDEX: 43.71 KG/M2 | OXYGEN SATURATION: 98 % | RESPIRATION RATE: 16 BRPM | HEART RATE: 78 BPM | HEIGHT: 64 IN | DIASTOLIC BLOOD PRESSURE: 82 MMHG | SYSTOLIC BLOOD PRESSURE: 124 MMHG | WEIGHT: 256 LBS

## 2025-08-19 DIAGNOSIS — Z12.31 ENCOUNTER FOR SCREENING MAMMOGRAM FOR HIGH-RISK PATIENT: ICD-10-CM

## 2025-08-19 DIAGNOSIS — Z12.11 COLON CANCER SCREENING: ICD-10-CM

## 2025-08-19 DIAGNOSIS — R06.83 SNORING: ICD-10-CM

## 2025-08-19 DIAGNOSIS — Z00.00 ANNUAL PHYSICAL EXAM: Primary | ICD-10-CM

## 2025-08-19 PROCEDURE — 99397 PER PM REEVAL EST PAT 65+ YR: CPT | Performed by: FAMILY MEDICINE

## 2025-08-19 PROCEDURE — 90471 IMMUNIZATION ADMIN: CPT | Performed by: FAMILY MEDICINE

## 2025-08-19 PROCEDURE — 3079F DIAST BP 80-89 MM HG: CPT | Performed by: FAMILY MEDICINE

## 2025-08-19 PROCEDURE — 3008F BODY MASS INDEX DOCD: CPT | Performed by: FAMILY MEDICINE

## 2025-08-19 PROCEDURE — 3074F SYST BP LT 130 MM HG: CPT | Performed by: FAMILY MEDICINE

## 2025-08-19 PROCEDURE — 90750 HZV VACC RECOMBINANT IM: CPT | Performed by: FAMILY MEDICINE

## 2025-08-22 ENCOUNTER — PATIENT MESSAGE (OUTPATIENT)
Dept: FAMILY MEDICINE CLINIC | Facility: CLINIC | Age: 65
End: 2025-08-22

## 2025-08-23 LAB
ABSOLUTE BASOPHILS: 18 CELLS/UL (ref 0–200)
ABSOLUTE EOSINOPHILS: 41 CELLS/UL (ref 15–500)
ABSOLUTE LYMPHOCYTES: 2543 CELLS/UL (ref 850–3900)
ABSOLUTE MONOCYTES: 431 CELLS/UL (ref 200–950)
ABSOLUTE NEUTROPHILS: 2867 CELLS/UL (ref 1500–7800)
ALBUMIN/GLOBULIN RATIO: 1.2 (CALC) (ref 1–2.5)
ALBUMIN: 3.8 G/DL (ref 3.6–5.1)
ALKALINE PHOSPHATASE: 90 U/L (ref 37–153)
ALT: 25 U/L (ref 6–29)
AST: 23 U/L (ref 10–35)
BASOPHILS: 0.3 %
BILIRUBIN, TOTAL: 0.4 MG/DL (ref 0.2–1.2)
BUN: 11 MG/DL (ref 7–25)
CALCIUM: 9.1 MG/DL (ref 8.6–10.4)
CARBON DIOXIDE: 30 MMOL/L (ref 20–32)
CHLORIDE: 105 MMOL/L (ref 98–110)
CHOL/HDLC RATIO: 2.3 (CALC)
CHOLESTEROL, TOTAL: 166 MG/DL
CREATININE: 0.83 MG/DL (ref 0.5–1.05)
EGFR: 78 ML/MIN/1.73M2
EOSINOPHILS: 0.7 %
GLOBULIN: 3.1 G/DL (CALC) (ref 1.9–3.7)
GLUCOSE: 92 MG/DL (ref 65–99)
HDL CHOLESTEROL: 73 MG/DL
HEMATOCRIT: 40.7 % (ref 35–45)
HEMOGLOBIN A1C: 5.9 %
HEMOGLOBIN: 13.2 G/DL (ref 11.7–15.5)
LDL-CHOLESTEROL: 79 MG/DL (CALC)
LYMPHOCYTES: 43.1 %
MCH: 29 PG (ref 27–33)
MCHC: 32.4 G/DL (ref 32–36)
MCV: 89.5 FL (ref 80–100)
MONOCYTES: 7.3 %
MPV: 10.9 FL (ref 7.5–12.5)
NEUTROPHILS: 48.6 %
NON-HDL CHOLESTEROL: 93 MG/DL (CALC)
PLATELET COUNT: 276 THOUSAND/UL (ref 140–400)
POTASSIUM: 4.6 MMOL/L (ref 3.5–5.3)
PROTEIN, TOTAL: 6.9 G/DL (ref 6.1–8.1)
RDW: 13.7 % (ref 11–15)
RED BLOOD CELL COUNT: 4.55 MILLION/UL (ref 3.8–5.1)
SODIUM: 140 MMOL/L (ref 135–146)
T4, FREE: 1 NG/DL (ref 0.8–1.8)
TRIGLYCERIDES: 59 MG/DL
TSH: 1 MIU/L (ref 0.4–4.5)
VITAMIN B12: 459 PG/ML (ref 200–1100)
VITAMIN D, 1,25 (OH)2,$TOTAL: 40 PG/ML (ref 18–72)
VITAMIN D2, 1,25 (OH)2: <8 PG/ML
VITAMIN D3, 1,25 (OH)2: 40 PG/ML
WHITE BLOOD CELL COUNT: 5.9 THOUSAND/UL (ref 3.8–10.8)

## 2025-08-30 ENCOUNTER — HOSPITAL ENCOUNTER (OUTPATIENT)
Dept: MAMMOGRAPHY | Age: 65
Discharge: HOME | End: 2025-08-30
Attending: FAMILY MEDICINE

## 2025-08-30 DIAGNOSIS — Z12.31 ENCOUNTER FOR SCREENING MAMMOGRAM FOR HIGH-RISK PATIENT: ICD-10-CM

## 2025-08-30 PROCEDURE — 77067 SCR MAMMO BI INCL CAD: CPT | Performed by: FAMILY MEDICINE

## 2025-08-30 PROCEDURE — 77063 BREAST TOMOSYNTHESIS BI: CPT | Performed by: FAMILY MEDICINE

## (undated) NOTE — LETTER
01/27/20        [address] [place] [address] Drive      Dear David Flores records indicate that you have outstanding lab work and or testing that was ordered for you and has not yet been completed:  Orders Placed This Encounter

## (undated) NOTE — ED AVS SNAPSHOT
Dyana Opitz   MRN: [id number]    Department:  THE [facility] in [place]   Date of Visit:  8/6/2018           Disclosure     Insurance plans vary and the physician(s) referred by the ER may not be covered by your plan.  Please conta tell this physician (or your personal doctor if your instructions are to return to your personal doctor) about any new or lasting problems. The primary care or specialist physician will see patients referred from the [facility] Emergency Department.  Becky Mejía

## (undated) NOTE — LETTER
01/03/18        [address] [place] [address] Drive      Dear Carter Jimenes records indicate that you have outstanding lab work and or testing that was ordered for you and has not yet been completed:          T4 FREE Papa BONNER

## (undated) NOTE — MR AVS SNAPSHOT
[address]  [address] [id number] [phone]               Thank you for choosing us for your health care visit with Akanksha Smith DO.   We are glad to serve you and happy to provide you with this acharya Enjoy your food, but eat less. Fully enjoy your food when eating. Don’t eat while distracted and slow down. Avoid over sized portions. Don’t eat while when you’re bored.      EAT THESE FOODS MORE OFTEN: EAT THESE FOODS LESS OFTEN:   Make half your pl